# Patient Record
Sex: FEMALE | Race: WHITE | NOT HISPANIC OR LATINO | URBAN - METROPOLITAN AREA
[De-identification: names, ages, dates, MRNs, and addresses within clinical notes are randomized per-mention and may not be internally consistent; named-entity substitution may affect disease eponyms.]

---

## 2023-02-08 ENCOUNTER — LAB REQUISITION (OUTPATIENT)
Dept: LAB | Facility: HOSPITAL | Age: 88
End: 2023-02-08
Payer: MEDICARE

## 2023-02-08 DIAGNOSIS — Z79.01 LONG TERM (CURRENT) USE OF ANTICOAGULANTS: ICD-10-CM

## 2023-02-08 LAB
ALBUMIN SERPL-MCNC: 3 G/DL (ref 3.4–4.8)
ALBUMIN/GLOB SERPL: 1 RATIO (ref 1.1–2)
ALP SERPL-CCNC: 83 UNIT/L (ref 40–150)
ALT SERPL-CCNC: 18 UNIT/L (ref 0–55)
AST SERPL-CCNC: 27 UNIT/L (ref 5–34)
BASOPHILS # BLD AUTO: 0.04 X10(3)/MCL (ref 0–0.2)
BASOPHILS NFR BLD AUTO: 0.6 %
BILIRUBIN DIRECT+TOT PNL SERPL-MCNC: 1.9 MG/DL
BUN SERPL-MCNC: 26.5 MG/DL (ref 9.8–20.1)
CALCIUM SERPL-MCNC: 8.7 MG/DL (ref 8.4–10.2)
CHLORIDE SERPL-SCNC: 89 MMOL/L (ref 98–107)
CHOLEST SERPL-MCNC: 117 MG/DL
CHOLEST/HDLC SERPL: 4 {RATIO} (ref 0–5)
CO2 SERPL-SCNC: 36 MMOL/L (ref 23–31)
CREAT SERPL-MCNC: 1.1 MG/DL (ref 0.55–1.02)
EOSINOPHIL # BLD AUTO: 0.12 X10(3)/MCL (ref 0–0.9)
EOSINOPHIL NFR BLD AUTO: 1.8 %
ERYTHROCYTE [DISTWIDTH] IN BLOOD BY AUTOMATED COUNT: 14.2 % (ref 11.5–17)
GFR SERPLBLD CREATININE-BSD FMLA CKD-EPI: 48 MLS/MIN/1.73/M2
GLOBULIN SER-MCNC: 3.1 GM/DL (ref 2.4–3.5)
GLUCOSE SERPL-MCNC: 96 MG/DL (ref 82–115)
HCT VFR BLD AUTO: 34.4 % (ref 37–47)
HDLC SERPL-MCNC: 32 MG/DL (ref 35–60)
HGB BLD-MCNC: 11.7 GM/DL (ref 12–16)
IMM GRANULOCYTES # BLD AUTO: 0.02 X10(3)/MCL (ref 0–0.04)
IMM GRANULOCYTES NFR BLD AUTO: 0.3 %
LDLC SERPL CALC-MCNC: 75 MG/DL (ref 50–140)
LYMPHOCYTES # BLD AUTO: 1.25 X10(3)/MCL (ref 0.6–4.6)
LYMPHOCYTES NFR BLD AUTO: 18.5 %
MCH RBC QN AUTO: 35.3 PG
MCHC RBC AUTO-ENTMCNC: 34 MG/DL (ref 33–36)
MCV RBC AUTO: 103.9 FL (ref 80–94)
MONOCYTES # BLD AUTO: 0.93 X10(3)/MCL (ref 0.1–1.3)
MONOCYTES NFR BLD AUTO: 13.8 %
NEUTROPHILS # BLD AUTO: 4.39 X10(3)/MCL (ref 2.1–9.2)
NEUTROPHILS NFR BLD AUTO: 65 %
NRBC BLD AUTO-RTO: 0 %
PLATELET # BLD AUTO: 105 X10(3)/MCL (ref 130–400)
PMV BLD AUTO: 11.5 FL (ref 7.4–10.4)
POTASSIUM SERPL-SCNC: 2.8 MMOL/L (ref 3.5–5.1)
PREALB SERPL-MCNC: 8.4 MG/DL (ref 14–37)
PROT SERPL-MCNC: 6.1 GM/DL (ref 5.8–7.6)
RBC # BLD AUTO: 3.31 X10(6)/MCL (ref 4.2–5.4)
SODIUM SERPL-SCNC: 137 MMOL/L (ref 136–145)
TRIGL SERPL-MCNC: 48 MG/DL (ref 37–140)
TSH SERPL-ACNC: 1.63 UIU/ML (ref 0.35–4.94)
VLDLC SERPL CALC-MCNC: 10 MG/DL
WBC # SPEC AUTO: 6.8 X10(3)/MCL (ref 4.5–11.5)

## 2023-02-08 PROCEDURE — 80053 COMPREHEN METABOLIC PANEL: CPT | Performed by: NURSE PRACTITIONER

## 2023-02-08 PROCEDURE — 80061 LIPID PANEL: CPT | Performed by: NURSE PRACTITIONER

## 2023-02-08 PROCEDURE — 85025 COMPLETE CBC W/AUTO DIFF WBC: CPT | Performed by: NURSE PRACTITIONER

## 2023-02-08 PROCEDURE — 84443 ASSAY THYROID STIM HORMONE: CPT | Performed by: NURSE PRACTITIONER

## 2023-02-08 PROCEDURE — 84134 ASSAY OF PREALBUMIN: CPT | Performed by: NURSE PRACTITIONER

## 2023-02-17 ENCOUNTER — LAB REQUISITION (OUTPATIENT)
Dept: LAB | Facility: HOSPITAL | Age: 88
End: 2023-02-17
Payer: MEDICARE

## 2023-02-17 DIAGNOSIS — I10 ESSENTIAL (PRIMARY) HYPERTENSION: ICD-10-CM

## 2023-02-17 LAB
ANION GAP SERPL CALC-SCNC: 13 MEQ/L
BUN SERPL-MCNC: 34.1 MG/DL (ref 9.8–20.1)
CALCIUM SERPL-MCNC: 8.9 MG/DL (ref 8.4–10.2)
CHLORIDE SERPL-SCNC: 84 MMOL/L (ref 98–107)
CO2 SERPL-SCNC: 35 MMOL/L (ref 23–31)
CREAT SERPL-MCNC: 1.01 MG/DL (ref 0.55–1.02)
CREAT/UREA NIT SERPL: 34
GFR SERPLBLD CREATININE-BSD FMLA CKD-EPI: 53 MLS/MIN/1.73/M2
GLUCOSE SERPL-MCNC: 98 MG/DL (ref 82–115)
POTASSIUM SERPL-SCNC: 2.9 MMOL/L (ref 3.5–5.1)
SODIUM SERPL-SCNC: 132 MMOL/L (ref 136–145)

## 2023-02-17 PROCEDURE — 80048 BASIC METABOLIC PNL TOTAL CA: CPT | Performed by: INTERNAL MEDICINE

## 2023-03-05 ENCOUNTER — HOSPITAL ENCOUNTER (INPATIENT)
Facility: HOSPITAL | Age: 88
LOS: 10 days | Discharge: NURSING, REHAB OR LONG-TERM CARE | DRG: 177 | End: 2023-03-15
Attending: EMERGENCY MEDICINE | Admitting: INTERNAL MEDICINE
Payer: MEDICARE

## 2023-03-05 DIAGNOSIS — I50.9 ACUTE ON CHRONIC CONGESTIVE HEART FAILURE: ICD-10-CM

## 2023-03-05 DIAGNOSIS — J90 PLEURAL EFFUSION: ICD-10-CM

## 2023-03-05 DIAGNOSIS — R79.89 ELEVATED D-DIMER: ICD-10-CM

## 2023-03-05 DIAGNOSIS — R60.9 SWELLING: ICD-10-CM

## 2023-03-05 DIAGNOSIS — I50.9 ACUTE ON CHRONIC CONGESTIVE HEART FAILURE, UNSPECIFIED HEART FAILURE TYPE: Primary | ICD-10-CM

## 2023-03-05 DIAGNOSIS — R06.02 SHORTNESS OF BREATH: ICD-10-CM

## 2023-03-05 DIAGNOSIS — I82.409 DVT (DEEP VENOUS THROMBOSIS): ICD-10-CM

## 2023-03-05 DIAGNOSIS — U07.1 COVID: ICD-10-CM

## 2023-03-05 LAB
ALBUMIN SERPL-MCNC: 3 G/DL (ref 3.4–4.8)
ALBUMIN/GLOB SERPL: 0.8 RATIO (ref 1.1–2)
ALP SERPL-CCNC: 110 UNIT/L (ref 40–150)
ALT SERPL-CCNC: 21 UNIT/L (ref 0–55)
AST SERPL-CCNC: 54 UNIT/L (ref 5–34)
BASOPHILS # BLD AUTO: 0.03 X10(3)/MCL (ref 0–0.2)
BASOPHILS NFR BLD AUTO: 0.4 %
BILIRUBIN DIRECT+TOT PNL SERPL-MCNC: 3.1 MG/DL
BNP BLD-MCNC: 1080.3 PG/ML
BUN SERPL-MCNC: 25 MG/DL (ref 9.8–20.1)
CALCIUM SERPL-MCNC: 8.6 MG/DL (ref 8.4–10.2)
CHLORIDE SERPL-SCNC: 92 MMOL/L (ref 98–107)
CO2 SERPL-SCNC: 31 MMOL/L (ref 23–31)
CREAT SERPL-MCNC: 1.23 MG/DL (ref 0.55–1.02)
EOSINOPHIL # BLD AUTO: 0.09 X10(3)/MCL (ref 0–0.9)
EOSINOPHIL NFR BLD AUTO: 1.3 %
ERYTHROCYTE [DISTWIDTH] IN BLOOD BY AUTOMATED COUNT: 15.3 % (ref 11.5–17)
FLUAV AG UPPER RESP QL IA.RAPID: NOT DETECTED
FLUBV AG UPPER RESP QL IA.RAPID: NOT DETECTED
GFR SERPLBLD CREATININE-BSD FMLA CKD-EPI: 42 MLS/MIN/1.73/M2
GLOBULIN SER-MCNC: 3.8 GM/DL (ref 2.4–3.5)
GLUCOSE SERPL-MCNC: 108 MG/DL (ref 82–115)
HCT VFR BLD AUTO: 38.4 % (ref 37–47)
HGB BLD-MCNC: 12.4 G/DL (ref 12–16)
IMM GRANULOCYTES # BLD AUTO: 0.03 X10(3)/MCL (ref 0–0.04)
IMM GRANULOCYTES NFR BLD AUTO: 0.4 %
INR BLD: 2.9 (ref 0–1.3)
LYMPHOCYTES # BLD AUTO: 1.19 X10(3)/MCL (ref 0.6–4.6)
LYMPHOCYTES NFR BLD AUTO: 17.2 %
MACROCYTES BLD QL SMEAR: ABNORMAL
MCH RBC QN AUTO: 34.3 PG
MCHC RBC AUTO-ENTMCNC: 32.3 G/DL (ref 33–36)
MCV RBC AUTO: 106.1 FL (ref 80–94)
MONOCYTES # BLD AUTO: 0.9 X10(3)/MCL (ref 0.1–1.3)
MONOCYTES NFR BLD AUTO: 13 %
NEUTROPHILS # BLD AUTO: 4.69 X10(3)/MCL (ref 2.1–9.2)
NEUTROPHILS NFR BLD AUTO: 67.7 %
NRBC BLD AUTO-RTO: 0 %
PLATELET # BLD AUTO: 101 X10(3)/MCL (ref 130–400)
PLATELET # BLD EST: ABNORMAL 10*3/UL
PMV BLD AUTO: 11 FL (ref 7.4–10.4)
POTASSIUM SERPL-SCNC: 4.1 MMOL/L (ref 3.5–5.1)
PROT SERPL-MCNC: 6.8 GM/DL (ref 5.8–7.6)
PROTHROMBIN TIME: 29.7 SECONDS (ref 12.5–14.5)
RBC # BLD AUTO: 3.62 X10(6)/MCL (ref 4.2–5.4)
RBC MORPH BLD: ABNORMAL
RSV A 5' UTR RNA NPH QL NAA+PROBE: NOT DETECTED
SARS-COV-2 RNA RESP QL NAA+PROBE: DETECTED
SODIUM SERPL-SCNC: 132 MMOL/L (ref 136–145)
STIPPLED RBC (OHS): ABNORMAL
TROPONIN I SERPL-MCNC: 0.01 NG/ML (ref 0–0.04)
WBC # SPEC AUTO: 6.9 X10(3)/MCL (ref 4.5–11.5)

## 2023-03-05 PROCEDURE — 85610 PROTHROMBIN TIME: CPT | Performed by: STUDENT IN AN ORGANIZED HEALTH CARE EDUCATION/TRAINING PROGRAM

## 2023-03-05 PROCEDURE — 85025 COMPLETE CBC W/AUTO DIFF WBC: CPT | Performed by: STUDENT IN AN ORGANIZED HEALTH CARE EDUCATION/TRAINING PROGRAM

## 2023-03-05 PROCEDURE — 63600175 PHARM REV CODE 636 W HCPCS: Performed by: EMERGENCY MEDICINE

## 2023-03-05 PROCEDURE — 11000001 HC ACUTE MED/SURG PRIVATE ROOM

## 2023-03-05 PROCEDURE — 0241U COVID/RSV/FLU A&B PCR: CPT | Performed by: STUDENT IN AN ORGANIZED HEALTH CARE EDUCATION/TRAINING PROGRAM

## 2023-03-05 PROCEDURE — 99285 EMERGENCY DEPT VISIT HI MDM: CPT | Mod: 25

## 2023-03-05 PROCEDURE — 84484 ASSAY OF TROPONIN QUANT: CPT | Performed by: STUDENT IN AN ORGANIZED HEALTH CARE EDUCATION/TRAINING PROGRAM

## 2023-03-05 PROCEDURE — 83880 ASSAY OF NATRIURETIC PEPTIDE: CPT | Performed by: STUDENT IN AN ORGANIZED HEALTH CARE EDUCATION/TRAINING PROGRAM

## 2023-03-05 PROCEDURE — 80053 COMPREHEN METABOLIC PANEL: CPT | Performed by: STUDENT IN AN ORGANIZED HEALTH CARE EDUCATION/TRAINING PROGRAM

## 2023-03-05 PROCEDURE — 25000003 PHARM REV CODE 250: Performed by: EMERGENCY MEDICINE

## 2023-03-05 PROCEDURE — 93005 ELECTROCARDIOGRAM TRACING: CPT

## 2023-03-05 PROCEDURE — 93010 ELECTROCARDIOGRAM REPORT: CPT | Mod: ,,, | Performed by: INTERNAL MEDICINE

## 2023-03-05 PROCEDURE — 93010 EKG 12-LEAD: ICD-10-PCS | Mod: ,,, | Performed by: INTERNAL MEDICINE

## 2023-03-05 RX ORDER — NAPROXEN SODIUM 220 MG/1
162 TABLET, FILM COATED ORAL
Status: COMPLETED | OUTPATIENT
Start: 2023-03-05 | End: 2023-03-05

## 2023-03-05 RX ORDER — GUAIFENESIN/DEXTROMETHORPHAN 100-10MG/5
10 SYRUP ORAL EVERY 4 HOURS PRN
Status: DISCONTINUED | OUTPATIENT
Start: 2023-03-06 | End: 2023-03-15 | Stop reason: HOSPADM

## 2023-03-05 RX ORDER — FUROSEMIDE 10 MG/ML
80 INJECTION INTRAMUSCULAR; INTRAVENOUS
Status: COMPLETED | OUTPATIENT
Start: 2023-03-05 | End: 2023-03-05

## 2023-03-05 RX ADMIN — FUROSEMIDE 80 MG: 10 INJECTION, SOLUTION INTRAMUSCULAR; INTRAVENOUS at 11:03

## 2023-03-05 RX ADMIN — GUAIFENESIN SYRUP AND DEXTROMETHORPHAN 10 ML: 100; 10 SYRUP ORAL at 11:03

## 2023-03-05 RX ADMIN — ASPIRIN 81 MG 162 MG: 81 TABLET ORAL at 11:03

## 2023-03-05 RX ADMIN — NITROGLYCERIN 1 INCH: 20 OINTMENT TOPICAL at 11:03

## 2023-03-06 PROBLEM — I50.9 ACUTE ON CHRONIC CONGESTIVE HEART FAILURE: Status: ACTIVE | Noted: 2023-03-06

## 2023-03-06 LAB
ALBUMIN SERPL-MCNC: 2.6 G/DL (ref 3.4–4.8)
ALBUMIN/GLOB SERPL: 0.8 RATIO (ref 1.1–2)
ALP SERPL-CCNC: 94 UNIT/L (ref 40–150)
ALT SERPL-CCNC: 18 UNIT/L (ref 0–55)
AST SERPL-CCNC: 45 UNIT/L (ref 5–34)
BASOPHILS # BLD AUTO: 0.01 X10(3)/MCL (ref 0–0.2)
BASOPHILS NFR BLD AUTO: 0.1 %
BILIRUBIN DIRECT+TOT PNL SERPL-MCNC: 3.6 MG/DL
BUN SERPL-MCNC: 25.2 MG/DL (ref 9.8–20.1)
CALCIUM SERPL-MCNC: 8.3 MG/DL (ref 8.4–10.2)
CHLORIDE SERPL-SCNC: 92 MMOL/L (ref 98–107)
CO2 SERPL-SCNC: 29 MMOL/L (ref 23–31)
CREAT SERPL-MCNC: 1.01 MG/DL (ref 0.55–1.02)
D DIMER PPP IA.FEU-MCNC: 1.78 UG/ML FEU (ref 0–0.5)
EOSINOPHIL # BLD AUTO: 0 X10(3)/MCL (ref 0–0.9)
EOSINOPHIL NFR BLD AUTO: 0 %
ERYTHROCYTE [DISTWIDTH] IN BLOOD BY AUTOMATED COUNT: 15.3 % (ref 11.5–17)
GFR SERPLBLD CREATININE-BSD FMLA CKD-EPI: 53 MLS/MIN/1.73/M2
GLOBULIN SER-MCNC: 3.2 GM/DL (ref 2.4–3.5)
GLUCOSE SERPL-MCNC: 116 MG/DL (ref 82–115)
HCT VFR BLD AUTO: 34.3 % (ref 37–47)
HGB BLD-MCNC: 11.3 G/DL (ref 12–16)
IMM GRANULOCYTES # BLD AUTO: 0.03 X10(3)/MCL (ref 0–0.04)
IMM GRANULOCYTES NFR BLD AUTO: 0.4 %
LYMPHOCYTES # BLD AUTO: 0.63 X10(3)/MCL (ref 0.6–4.6)
LYMPHOCYTES NFR BLD AUTO: 9.1 %
MCH RBC QN AUTO: 34.8 PG
MCHC RBC AUTO-ENTMCNC: 32.9 G/DL (ref 33–36)
MCV RBC AUTO: 105.5 FL (ref 80–94)
MONOCYTES # BLD AUTO: 0.51 X10(3)/MCL (ref 0.1–1.3)
MONOCYTES NFR BLD AUTO: 7.4 %
NEUTROPHILS # BLD AUTO: 5.74 X10(3)/MCL (ref 2.1–9.2)
NEUTROPHILS NFR BLD AUTO: 83 %
NRBC BLD AUTO-RTO: 0 %
PLATELET # BLD AUTO: 86 X10(3)/MCL (ref 130–400)
PMV BLD AUTO: 11.3 FL (ref 7.4–10.4)
POTASSIUM SERPL-SCNC: 3.7 MMOL/L (ref 3.5–5.1)
PROT SERPL-MCNC: 5.8 GM/DL (ref 5.8–7.6)
RBC # BLD AUTO: 3.25 X10(6)/MCL (ref 4.2–5.4)
SODIUM SERPL-SCNC: 131 MMOL/L (ref 136–145)
WBC # SPEC AUTO: 6.9 X10(3)/MCL (ref 4.5–11.5)

## 2023-03-06 PROCEDURE — 85379 FIBRIN DEGRADATION QUANT: CPT | Performed by: INTERNAL MEDICINE

## 2023-03-06 PROCEDURE — 11000001 HC ACUTE MED/SURG PRIVATE ROOM

## 2023-03-06 PROCEDURE — 63600175 PHARM REV CODE 636 W HCPCS: Performed by: INTERNAL MEDICINE

## 2023-03-06 PROCEDURE — 27000221 HC OXYGEN, UP TO 24 HOURS

## 2023-03-06 PROCEDURE — 27000207 HC ISOLATION

## 2023-03-06 PROCEDURE — 21400001 HC TELEMETRY ROOM

## 2023-03-06 PROCEDURE — 25000003 PHARM REV CODE 250: Performed by: INTERNAL MEDICINE

## 2023-03-06 PROCEDURE — 25000003 PHARM REV CODE 250: Performed by: EMERGENCY MEDICINE

## 2023-03-06 PROCEDURE — 80053 COMPREHEN METABOLIC PANEL: CPT | Performed by: INTERNAL MEDICINE

## 2023-03-06 PROCEDURE — 25000242 PHARM REV CODE 250 ALT 637 W/ HCPCS: Performed by: INTERNAL MEDICINE

## 2023-03-06 PROCEDURE — 85025 COMPLETE CBC W/AUTO DIFF WBC: CPT | Performed by: INTERNAL MEDICINE

## 2023-03-06 RX ORDER — RIVAROXABAN 20 MG/1
1 TABLET, FILM COATED ORAL DAILY
COMMUNITY
Start: 2023-01-23

## 2023-03-06 RX ORDER — HYDROXYZINE PAMOATE 25 MG/1
25 CAPSULE ORAL 3 TIMES DAILY PRN
Status: DISCONTINUED | OUTPATIENT
Start: 2023-03-06 | End: 2023-03-15 | Stop reason: HOSPADM

## 2023-03-06 RX ORDER — METOPROLOL TARTRATE 25 MG/1
12.5 TABLET ORAL 2 TIMES DAILY
Status: DISCONTINUED | OUTPATIENT
Start: 2023-03-06 | End: 2023-03-15 | Stop reason: HOSPADM

## 2023-03-06 RX ORDER — TORSEMIDE 10 MG/1
2 TABLET ORAL DAILY
COMMUNITY
Start: 2023-01-23

## 2023-03-06 RX ORDER — HYDROXYZINE PAMOATE 25 MG/1
25 CAPSULE ORAL 3 TIMES DAILY PRN
COMMUNITY
Start: 2023-01-25

## 2023-03-06 RX ORDER — SODIUM CHLORIDE 0.9 % (FLUSH) 0.9 %
10 SYRINGE (ML) INJECTION
Status: DISCONTINUED | OUTPATIENT
Start: 2023-03-06 | End: 2023-03-15 | Stop reason: HOSPADM

## 2023-03-06 RX ORDER — ASPIRIN 81 MG/1
81 TABLET ORAL EVERY MORNING
Status: DISCONTINUED | OUTPATIENT
Start: 2023-03-06 | End: 2023-03-15 | Stop reason: HOSPADM

## 2023-03-06 RX ORDER — NEBIVOLOL 20 MG/1
1 TABLET ORAL DAILY
COMMUNITY
Start: 2023-01-23

## 2023-03-06 RX ORDER — TALC
6 POWDER (GRAM) TOPICAL NIGHTLY PRN
Status: DISCONTINUED | OUTPATIENT
Start: 2023-03-06 | End: 2023-03-15 | Stop reason: HOSPADM

## 2023-03-06 RX ORDER — ASPIRIN 81 MG/1
1 TABLET ORAL EVERY MORNING
COMMUNITY
Start: 2023-01-25

## 2023-03-06 RX ORDER — FUROSEMIDE 10 MG/ML
40 INJECTION INTRAMUSCULAR; INTRAVENOUS
Status: DISCONTINUED | OUTPATIENT
Start: 2023-03-06 | End: 2023-03-07

## 2023-03-06 RX ORDER — AZILSARTAN KAMEDOXOMIL AND CHLORTHALIDONE 40; 12.5 MG/1; MG/1
1 TABLET ORAL DAILY
COMMUNITY
Start: 2023-01-23

## 2023-03-06 RX ADMIN — DEXAMETHASONE 6 MG: 2 TABLET ORAL at 03:03

## 2023-03-06 RX ADMIN — GUAIFENESIN SYRUP AND DEXTROMETHORPHAN 10 ML: 100; 10 SYRUP ORAL at 11:03

## 2023-03-06 RX ADMIN — METOPROLOL TARTRATE 12.5 MG: 25 TABLET, FILM COATED ORAL at 11:03

## 2023-03-06 RX ADMIN — RIVAROXABAN 20 MG: 10 TABLET, FILM COATED ORAL at 04:03

## 2023-03-06 RX ADMIN — ASPIRIN 81 MG: 81 TABLET, COATED ORAL at 07:03

## 2023-03-06 RX ADMIN — FUROSEMIDE 40 MG: 10 INJECTION, SOLUTION INTRAMUSCULAR; INTRAVENOUS at 11:03

## 2023-03-06 RX ADMIN — REMDESIVIR 200 MG: 100 INJECTION, POWDER, LYOPHILIZED, FOR SOLUTION INTRAVENOUS at 03:03

## 2023-03-06 RX ADMIN — METOPROLOL TARTRATE 12.5 MG: 25 TABLET, FILM COATED ORAL at 09:03

## 2023-03-06 NOTE — ED PROVIDER NOTES
Encounter Date: 3/5/2023    SCRIBE #1 NOTE: I, Shannon Dorie, am scribing for, and in the presence of,  Del Washington MD. I have scribed the following portions of the note - Other sections scribed: HPI, ROS, PE, MDM.     History     Chief Complaint   Patient presents with    Shortness of Breath     Arrived by [facility] from [facility]. EMS reports sob and nonproductive cough. O2 96% on 2L NC on arrival, recently placed on oxygen 1 week ago. Denies CP. Hx of CHF, takes lasix      Patient is an 89 year old female with a pacemaker and a hx of CHF that presents to the ED via EMS from [facility] for shortness of breath onset a week ago. She also complains of a dry cough and bilateral leg swelling. She reports she was given a series of 3 Lasix injections that began on Wednesday 03/01. She denies chest pain, fever, nausea, and vomiting. Patient is prescribed Lasix.    PCP: Dr. Francheska M.D.    The history is provided by the patient and medical records. No  was used.   Shortness of Breath  This is a new problem. The current episode started more than 1 week ago. The problem has not changed since onset.Associated symptoms include cough and leg swelling. Pertinent negatives include no fever, no neck pain, no chest pain, no vomiting and no abdominal pain.   Review of patient's allergies indicates:   Allergen Reactions    Opioids - morphine analogues Anxiety     No past medical history on file.  No past surgical history on file.  No family history on file.     Review of Systems   Constitutional:  Negative for chills and fever.   Respiratory:  Positive for cough and shortness of breath. Negative for chest tightness.    Cardiovascular:  Positive for leg swelling. Negative for chest pain.   Gastrointestinal:  Negative for abdominal pain, nausea and vomiting.   Musculoskeletal:  Negative for myalgias and neck pain.   Neurological:  Negative for syncope.   All other systems reviewed and are negative.    Physical  Exam     Initial Vitals [03/05/23 2126]   BP Pulse Resp Temp SpO2   (!) 164/76 80 18 98.4 °F (36.9 °C) 95 %      MAP       --         Physical Exam    Nursing note and vitals reviewed.  Constitutional: She appears well-developed and well-nourished. No distress.   HENT:   Head: Normocephalic and atraumatic.   Eyes: Conjunctivae are normal.   Cardiovascular:  Normal rate and intact distal pulses.           Pulmonary/Chest: Tachypnea noted. No respiratory distress. She has no rhonchi.   Diffuse crackles. Coarse breath sounds.    Abdominal: Abdomen is soft. Bowel sounds are normal. There is no abdominal tenderness. There is no rebound and no guarding.   Musculoskeletal:         General: Edema present.      Comments: Bilateral lower extremities edema.      Neurological: She is alert. She has normal strength.   Skin: Skin is warm and dry.   Psychiatric: She has a normal mood and affect.       ED Course   Procedures  Labs Reviewed   COVID/RSV/FLU A&B PCR - Abnormal; Notable for the following components:       Result Value    SARS-CoV-2 PCR Detected (*)     All other components within normal limits    Narrative:     The Xpert Xpress SARS-CoV-2/FLU/RSV plus is a rapid, multiplexed real-time PCR test intended for the simultaneous qualitative detection and differentiation of SARS-CoV-2, Influenza A, Influenza B, and respiratory syncytial virus (RSV) viral RNA in either nasopharyngeal swab or nasal swab specimens.         COMPREHENSIVE METABOLIC PANEL - Abnormal; Notable for the following components:    Sodium Level 132 (*)     Chloride 92 (*)     Blood Urea Nitrogen 25.0 (*)     Creatinine 1.23 (*)     Albumin Level 3.0 (*)     Globulin 3.8 (*)     Albumin/Globulin Ratio 0.8 (*)     Bilirubin Total 3.1 (*)     Aspartate Aminotransferase 54 (*)     All other components within normal limits   B-TYPE NATRIURETIC PEPTIDE - Abnormal; Notable for the following components:    Natriuretic Peptide 1,080.3 (*)     All other components  within normal limits   PROTIME-INR - Abnormal; Notable for the following components:    PT 29.7 (*)     INR 2.90 (*)     All other components within normal limits   CBC WITH DIFFERENTIAL - Abnormal; Notable for the following components:    RBC 3.62 (*)     .1 (*)     MCHC 32.3 (*)     Platelet 101 (*)     MPV 11.0 (*)     All other components within normal limits   BLOOD SMEAR MICROSCOPIC EXAM (OLG) - Abnormal; Notable for the following components:    RBC Morph Abnormal (*)     Macrocyte 1+ (*)     Stippled RBC 1+ (*)     Platelet Est Decreased (*)     All other components within normal limits   TROPONIN I - Normal   CBC W/ AUTO DIFFERENTIAL    Narrative:     The following orders were created for panel order CBC auto differential.  Procedure                               Abnormality         Status                     ---------                               -----------         ------                     CBC with Differential[[id number]]        Abnormal            Final result                 Please view results for these tests on the individual orders.          Imaging Results              X-Ray Chest AP Portable (Preliminary result)  Result time 03/05/23 23:17:20      Wet Read by Del Washington MD (03/05/23 23:17:20, [facility] - Emergency Dept, Emergency Medicine)    Pleural effusions and congestive changes                                     Medications   dextromethorphan-guaiFENesin  mg/5 ml liquid 10 mL (has no administration in time range)   furosemide injection 80 mg (80 mg Intravenous Given 3/5/23 2340)   nitroGLYCERIN 2% TD oint ointment 1 inch (1 inch Transdermal Given 3/5/23 2340)   aspirin chewable tablet 162 mg (162 mg Oral Given 3/5/23 2340)          Medical Decision Making  Patient has a pacemaker and a hx of CHF. She presents to the ED via EMS from [facility] for shortness of breath onset a week ago. She also complains of a dry cough and bilateral leg swelling. She  reports she was given a series of 3 Lasix injections that began on Wednesday 03/01. She denies chest pain, fever, nausea, and vomiting. Patient takes Lasix.     EMS reports O2 96% on 2L NC on arrival. Notes patient was placed on oxygen a week ago.       Amount and/or Complexity of Data Reviewed  External Data Reviewed: labs, radiology and ECG.  Labs: ordered. Decision-making details documented in ED Course.  Radiology: ordered and independent interpretation performed. Decision-making details documented in ED Course.  ECG/medicine tests: ordered and independent interpretation performed. Decision-making details documented in ED Course.              Scribe Attestation:   Scribe #1: I performed the above scribed service and the documentation accurately describes the services I performed. I attest to the accuracy of the note.    Attending Attestation:           Physician Attestation for Scribe:  Physician Attestation Statement for Scribe #1: I, Del Washington MD, reviewed documentation, as scribed by Shannon Oshea in my presence, and it is both accurate and complete.           ED Course as of 03/05/23 2357   Sun Mar 05, 2023   2355 Patient is not typically on oxygen at the nursing home she was started on 2 L on Tuesday of this week.  She received IV Lasix at that facility for 3 days without improvement of symptoms.  Gave 80 of IV Lasix here.  Till the returned positive.  Discussed with the hospitalist who will admit [LF]      ED Course User Index  [LF] Del Washington MD                 Clinical Impression:   Final diagnoses:  [R06.02] Shortness of breath  [I50.9] Acute on chronic congestive heart failure, unspecified heart failure type (Primary)  [J90] Pleural effusion  [U07.1] COVID        ED Disposition Condition    Admit Stable                Del Washington MD  03/05/23 8744

## 2023-03-06 NOTE — H&P
[facility] Hospital Medicine History & Physical Examination       Patient Name: Ana Lake  MRN: [id number]  Patient Class: IP- Inpatient   Admission Date: 3/5/2023  9:42 PM  Length of Stay: 1  Admitting Service: Hospital Medicine   Attending Physician: Andre Navarro MD   Primary Care Provider: No primary care provider on file.  History source: EMR, patient and/or patient's family    CHIEF COMPLAINT   Shortness of Breath (Arrived by [facility] from [facility]. EMS reports sob and nonproductive cough. O2 96% on 2L NC on arrival, recently placed on oxygen 1 week ago. Denies CP. Hx of CHF, takes lasix )      HISTORY OF PRESENT ILLNESS:   Patient is a very pleasant 89-year-old female with past medical history of CHF (no echocardiogram on record), AFib on Xarelto and hypertension who presented to the ER for persistent severe cough and shortness of breath.  Patient reports she is been seen in the Cardiology Clinic 3 days in a row received IV Lasix all visits but despite this has continued to need supplemental oxygen since Thursday.  Prior to Thursday she is not required oxygen.  Due to persistent symptoms she was brought to the ER tonight for further evaluation.      On arrival to the ER she was afebrile, hemodynamically stable maintaining sats in the mid 90s on 3 L supplemental oxygen.  Laboratory work was significant for a BNP of 1000.  SARS-COVID-2 testing was positive.  Chest x-ray showed vascular congestion.  Patient reports being vaccinated but not boosted for COVID-19.  Hospitalist was consulted for admission and she was given a dose of IV Lasix.    PAST MEDICAL HISTORY:   CHF with unknown LVEF  AFib on Xarelto   Hypertension    PAST SURGICAL HISTORY:   Bilateral hip surgery   Hysterectomy  Appendectomy    ALLERGIES:   Opioids - morphine analogues    FAMILY HISTORY:   Reviewed and non-contributory     SOCIAL HISTORY:   No current tobacco drug or alcohol use    HOME  MEDICATIONS:     aspirin (ECOTRIN) 81 MG EC tablet Take 1 tablet by mouth every morning.   EDARBYCLOR 40-12.5 mg Tab Take 1 tablet by mouth once daily.   hydrOXYzine pamoate (VISTARIL) 25 MG Cap Take 25 mg by mouth 3 (three) times daily as needed.   nebivoloL (BYSTOLIC) 20 mg Tab Take 1 tablet by mouth once daily.   torsemide (DEMADEX) 10 MG Tab Take 2 tablets by mouth once daily.   XARELTO 20 mg Tab Take 1 tablet by mouth once daily.       REVIEW OF SYSTEMS:   Except as documented, all other systems reviewed and negative     PHYSICAL EXAM:   T 98.4 °F (36.9 °C)   BP (!) 140/67   P 61   RR (!) 25   O2 95 %  GENERAL: awake, alert, oriented and in no acute distress, non-toxic appearing   HEENT: normocephalic atraumatic   NECK: supple   LUNGS:  Coarse anteriorly with bilateral rhonchi   CVS: Regular rate and rhythm, normal peripheral perfusion  ABD: Soft, non-tender, non-distended, bowel sounds present  EXTREMITIES: no clubbing or cyanosis  SKIN: Warm, dry.   NEURO: alert and oriented, grossly without focal deficits   PSYCHIATRIC: Cooperative    LABS AND IMAGING:     Recent Labs     03/05/23 2203   WBC 6.9   RBC 3.62*   HGB 12.4   HCT 38.4   .1*   MCH 34.3   MCHC 32.3*   RDW 15.3   *     No results for input(s): LACTIC in the last 72 hours.  Recent Labs     03/05/23 2203   INR 2.90*     No results for input(s): HGBA1C, CHOL, TRIG, LDL, VLDL, HDL in the last 72 hours.   Recent Labs     03/05/23 2203   *   K 4.1   CHLORIDE 92*   CO2 31   BUN 25.0*   CREATININE 1.23*   GLUCOSE 108   CALCIUM 8.6   ALBUMIN 3.0*   GLOBULIN 3.8*   ALKPHOS 110   ALT 21   AST 54*   BILITOT 3.1*     Recent Labs     03/05/23 2203   BNP 1,080.3*   TROPONINI 0.015            ASSESSMENT & PLAN:   Multifactorial acute hypoxemic respiratory failure  Volume overload/decompensated heart failure contributing to above  COVID-19 infection/pneumonitis contributing to above  CHF with unknown LVEF  AFib on Xarelto   Hypertension    -  IV lasix, stric I and Os  - remdesivir/dexamethazone day 1  - wean o2 as tolerated   - resume home meds as appropriate     DVT prophylaxis: cont xarelto   Code status: full, as per daughter and patient     If patient was admitted under observational status it is with my approval/permission.     At least 55 min was spent on this history and physical.  Time seen: 1 AM   Critical care time = 35 min; Critical care diagnosis = hypoxia/covid   Andre Navarro MD

## 2023-03-06 NOTE — CONSULTS
Inpatient consult to Cardiology  Consult performed by: PEE Agustin  Consult ordered by: Andre Navarro MD  Reason for consult: Heart Failure      [facility] - Emergency Dept  Cardiology  Consult Note    Patient Name: Ana Lake  MRN: [id number]  Admission Date: 3/5/2023  Hospital Length of Stay: 1 days  Code Status: Full Code   Attending Provider: Andre Navarro MD   Consulting Provider: PEE Agustin  Primary Care Physician: No primary care provider on file.  Principal Problem:Acute on chronic congestive heart failure    Patient information was obtained from past medical records, ER records, and primary team.     Subjective:   Consultation Reason: Heart Failure    HPI:   Ms. Lake is a 89 year old female, known to Dr. Reyes, who presented to the hospital with persistent cough and shortness of breath. She was recently evaluated in [facility] and given IV Lasix due to her symptoms. Due to her persistent symptoms she presented to the hospital. She is noted to be COVID-19 Positive. Troponin is noted to be normal. BNP noted to be 10.80. She is negative for RSV and Acute Influenza infections. Chest Radiograph revealed pulmonary vascular congestion with small right pleural effusion. [facility] is consulted for HF Management.    PMH: Hypertension, PAF, SSS/PPM, Diastolic Heart Failure, Pulmonary Hypertension, Chronic Kidney Disease IV, VHD- MR, Lymphedema  PSH: Pacemaker, Appendectomy, Hysterectomy, Cataract Extraction, Hip Replacement  Family History: Father- Cancer, Mother- Emphysema, Cancer  Social History: Tobacco- Negative, Alcohol- Negative, Substance Abuse- Negative    Previous Cardiac Diagnostics:   Echocardiogram (3.1.23):  The study quality is average.   The left ventricle is decreased in size. Global left ventricular systolic function is normal. The left ventricular ejection fraction is 50%. The ventricular septum exhibits paradoxical motion, suggestive of right  ventricular pressure and/or volume overload. The left ventricle diastolic function is indeterminate. Concentric left ventricular hypertrophy is present. It is mild.   The right ventricle is moderately enlarged. The left atrial diameter is moderately increased. The right atrium is moderately enlarged.  Mild mitral annular calcification is noted.  Diffuse thickening of the aortic valve cusps is noted with normal cuspal excursion.  Severe (4+) tricuspid regurgitation. Moderate (2+) mitral regurgitation. Trace pulmonic regurgitation.   A dilated inferior vena cava is noted with poor inspiratory collapse. Hepatic vein flow reversal noted.  The estimated pulmonary artery systolic pressure is 35 mmHg assuming a right atrial pressure of 15 mmHg, appears to be underestimated.     MPI (10.10.17):  This is an abnormal perfusion study. There is no evidence of ischemia.   This scan is suggestive of low risk for future cardiovascular events.   Small fixed perfusion abnormality of mild intensity in the apical segment. Small fixed perfusion abnormality of mild intensity in the apical anterior segment.   The left ventricular cavity is noted to be normal on the stress study. The left ventricular ejection fraction was calculated to be 84% and left ventricular global function is hyperkinetic. This finding may be secondary to small ventricular size.   The study quality is good.     Review of patient's allergies indicates:   Allergen Reactions    Opioids - morphine analogues Anxiety       No current facility-administered medications on file prior to encounter.     Current Outpatient Medications on File Prior to Encounter   Medication Sig    aspirin (ECOTRIN) 81 MG EC tablet Take 1 tablet by mouth every morning.    EDARBYCLOR 40-12.5 mg Tab Take 1 tablet by mouth once daily.    hydrOXYzine pamoate (VISTARIL) 25 MG Cap Take 25 mg by mouth 3 (three) times daily as needed.    nebivoloL (BYSTOLIC) 20 mg Tab Take 1 tablet by mouth once daily.     torsemide (DEMADEX) 10 MG Tab Take 2 tablets by mouth once daily.    XARELTO 20 mg Tab Take 1 tablet by mouth once daily.     Review of Systems Deferred in the Setting of COVID-19 Infection.    Objective:     Vital Signs (Most Recent):  Temp: 97.2 °F (36.2 °C) (03/06/23 0300)  Pulse: 61 (03/06/23 0600)  Resp: (!) 35 (03/06/23 0300)  BP: (!) 118/54 (03/06/23 0300)  SpO2: (!) 93 % (03/06/23 0300)   Vital Signs (24h Range):  Temp:  [97.2 °F (36.2 °C)-98.4 °F (36.9 °C)] 97.2 °F (36.2 °C)  Pulse:  [60-80] 61  Resp:  [18-35] 35  SpO2:  [93 %-95 %] 93 %  BP: (118-164)/(54-76) 118/54     Weight: 77.1 kg (170 lb)  Body mass index is 34.34 kg/m².    SpO2: (!) 93 %       No intake or output data in the 24 hours ending 03/06/23 0738    Lines/Drains/Airways       Peripheral Intravenous Line  Duration                  Peripheral IV - Single Lumen 03/05/23 2150 18 G Anterior;Left Forearm <1 day                    Significant Labs:  Recent Results (from the past 72 hour(s))   COVID/RSV/FLU A&B PCR    Collection Time: 03/05/23  9:40 PM   Result Value Ref Range    Influenza A PCR Not Detected Not Detected    Influenza B PCR Not Detected Not Detected    Respiratory Syncytial Virus PCR Not Detected Not Detected    SARS-CoV-2 PCR Detected (A) Not Detected, Negative, Invalid   Comprehensive metabolic panel    Collection Time: 03/05/23 10:03 PM   Result Value Ref Range    Sodium Level 132 (L) 136 - 145 mmol/L    Potassium Level 4.1 3.5 - 5.1 mmol/L    Chloride 92 (L) 98 - 107 mmol/L    Carbon Dioxide 31 23 - 31 mmol/L    Glucose Level 108 82 - 115 mg/dL    Blood Urea Nitrogen 25.0 (H) 9.8 - 20.1 mg/dL    Creatinine 1.23 (H) 0.55 - 1.02 mg/dL    Calcium Level Total 8.6 8.4 - 10.2 mg/dL    Protein Total 6.8 5.8 - 7.6 gm/dL    Albumin Level 3.0 (L) 3.4 - 4.8 g/dL    Globulin 3.8 (H) 2.4 - 3.5 gm/dL    Albumin/Globulin Ratio 0.8 (L) 1.1 - 2.0 ratio    Bilirubin Total 3.1 (H) <=1.5 mg/dL    Alkaline Phosphatase 110 40 - 150 unit/L     Alanine Aminotransferase 21 0 - 55 unit/L    Aspartate Aminotransferase 54 (H) 5 - 34 unit/L    eGFR 42 mls/min/1.73/m2   Troponin I    Collection Time: 03/05/23 10:03 PM   Result Value Ref Range    Troponin-I 0.015 0.000 - 0.045 ng/mL   Brain natriuretic peptide    Collection Time: 03/05/23 10:03 PM   Result Value Ref Range    Natriuretic Peptide 1,080.3 (H) <=100.0 pg/mL   Protime-INR    Collection Time: 03/05/23 10:03 PM   Result Value Ref Range    PT 29.7 (H) 12.5 - 14.5 seconds    INR 2.90 (H) 0.00 - 1.30   CBC with Differential    Collection Time: 03/05/23 10:03 PM   Result Value Ref Range    WBC 6.9 4.5 - 11.5 x10(3)/mcL    RBC 3.62 (L) 4.20 - 5.40 x10(6)/mcL    Hgb 12.4 12.0 - 16.0 g/dL    Hct 38.4 37.0 - 47.0 %    .1 (H) 80.0 - 94.0 fL    MCH 34.3 pg    MCHC 32.3 (L) 33.0 - 36.0 g/dL    RDW 15.3 11.5 - 17.0 %    Platelet 101 (L) 130 - 400 x10(3)/mcL    MPV 11.0 (H) 7.4 - 10.4 fL    Neut % 67.7 %    Lymph % 17.2 %    Mono % 13.0 %    Eos % 1.3 %    Basophil % 0.4 %    Lymph # 1.19 0.6 - 4.6 x10(3)/mcL    Neut # 4.69 2.1 - 9.2 x10(3)/mcL    Mono # 0.90 0.1 - 1.3 x10(3)/mcL    Eos # 0.09 0 - 0.9 x10(3)/mcL    Baso # 0.03 0 - 0.2 x10(3)/mcL    IG# 0.03 0 - 0.04 x10(3)/mcL    IG% 0.4 %    NRBC% 0.0 %   Blood Smear Microscopic Exam    Collection Time: 03/05/23 10:03 PM   Result Value Ref Range    RBC Morph Abnormal (A) Normal    Macrocyte 1+ (A) (none)    Stippled RBC 1+ (A) (none)    Platelet Est Decreased (A) Normal, Adequate   Comprehensive metabolic panel    Collection Time: 03/06/23  6:11 AM   Result Value Ref Range    Sodium Level 131 (L) 136 - 145 mmol/L    Potassium Level 3.7 3.5 - 5.1 mmol/L    Chloride 92 (L) 98 - 107 mmol/L    Carbon Dioxide 29 23 - 31 mmol/L    Glucose Level 116 (H) 82 - 115 mg/dL    Blood Urea Nitrogen 25.2 (H) 9.8 - 20.1 mg/dL    Creatinine 1.01 0.55 - 1.02 mg/dL    Calcium Level Total 8.3 (L) 8.4 - 10.2 mg/dL    Protein Total 5.8 5.8 - 7.6 gm/dL    Albumin Level 2.6 (L) 3.4  - 4.8 g/dL    Globulin 3.2 2.4 - 3.5 gm/dL    Albumin/Globulin Ratio 0.8 (L) 1.1 - 2.0 ratio    Bilirubin Total 3.6 (H) <=1.5 mg/dL    Alkaline Phosphatase 94 40 - 150 unit/L    Alanine Aminotransferase 18 0 - 55 unit/L    Aspartate Aminotransferase 45 (H) 5 - 34 unit/L    eGFR 53 mls/min/1.73/m2   CBC with Differential    Collection Time: 03/06/23  6:11 AM   Result Value Ref Range    WBC 6.9 4.5 - 11.5 x10(3)/mcL    RBC 3.25 (L) 4.20 - 5.40 x10(6)/mcL    Hgb 11.3 (L) 12.0 - 16.0 g/dL    Hct 34.3 (L) 37.0 - 47.0 %    .5 (H) 80.0 - 94.0 fL    MCH 34.8 pg    MCHC 32.9 (L) 33.0 - 36.0 g/dL    RDW 15.3 11.5 - 17.0 %    Platelet 86 (L) 130 - 400 x10(3)/mcL    MPV 11.3 (H) 7.4 - 10.4 fL    Neut % 83.0 %    Lymph % 9.1 %    Mono % 7.4 %    Eos % 0.0 %    Basophil % 0.1 %    Lymph # 0.63 0.6 - 4.6 x10(3)/mcL    Neut # 5.74 2.1 - 9.2 x10(3)/mcL    Mono # 0.51 0.1 - 1.3 x10(3)/mcL    Eos # 0.00 0 - 0.9 x10(3)/mcL    Baso # 0.01 0 - 0.2 x10(3)/mcL    IG# 0.03 0 - 0.04 x10(3)/mcL    IG% 0.4 %    NRBC% 0.0 %       Significant Imaging:  Imaging Results              X-Ray Chest AP Portable (Preliminary result)  Result time 03/05/23 23:17:20      Wet Read by Del Washington MD (03/05/23 23:17:20, [facility] - Emergency Dept, Emergency Medicine)    Pleural effusions and congestive changes                                  EKG:        Telemetry:  Paced    Physical Exam  Vitals and nursing note reviewed.   Cardiovascular:      Rate and Rhythm: Normal rate and regular rhythm.   Limited Physical Exam in the Setting of COVID-19 Infection.    Home Medications:   No current facility-administered medications on file prior to encounter.     Current Outpatient Medications on File Prior to Encounter   Medication Sig Dispense Refill    aspirin (ECOTRIN) 81 MG EC tablet Take 1 tablet by mouth every morning.      EDARBYCLOR 40-12.5 mg Tab Take 1 tablet by mouth once daily.      hydrOXYzine pamoate (VISTARIL) 25 MG Cap Take  25 mg by mouth 3 (three) times daily as needed.      nebivoloL (BYSTOLIC) 20 mg Tab Take 1 tablet by mouth once daily.      torsemide (DEMADEX) 10 MG Tab Take 2 tablets by mouth once daily.      XARELTO 20 mg Tab Take 1 tablet by mouth once daily.         Current Inpatient Medications:    Current Facility-Administered Medications:     aspirin EC tablet 81 mg, 81 mg, Oral, QAM, Andre Navarro MD, 81 mg at 03/06/23 0700    azilsartan med-chlorthalidone 40-12.5 mg Tab 1 tablet, 1 tablet, Oral, Daily, Andre Navarro MD    dexAMETHasone tablet 6 mg, 6 mg, Oral, Daily, Andre Navarro MD, 6 mg at 03/06/23 0340    dextromethorphan-guaiFENesin  mg/5 ml liquid 10 mL, 10 mL, Oral, Q4H PRN, Del Washington MD, 10 mL at 03/05/23 2359    furosemide injection 40 mg, 40 mg, Intravenous, Q12H, Andre Navarro MD    hydrOXYzine pamoate capsule 25 mg, 25 mg, Oral, TID PRN, Andre Navarro MD    melatonin tablet 6 mg, 6 mg, Oral, Nightly PRN, Andre Navarro MD    metoprolol tartrate (LOPRESSOR) split tablet 12.5 mg, 12.5 mg, Oral, BID, Andre Navarro MD    [COMPLETED] remdesivir 200 mg in sodium chloride 0.9% 250 mL infusion, 200 mg, Intravenous, Q24H, Stopped at 03/06/23 0414 **FOLLOWED BY** [START ON 3/7/2023] remdesivir 100 mg in sodium chloride 0.9% 100 mL infusion, 100 mg, Intravenous, Daily, Andre Navarro MD    rivaroxaban tablet 20 mg, 20 mg, Oral, with dinner, Andre Navarro MD    sodium chloride 0.9% flush 10 mL, 10 mL, Intravenous, PRN, Andre Navarro MD    Current Outpatient Medications:     aspirin (ECOTRIN) 81 MG EC tablet, Take 1 tablet by mouth every morning., Disp: , Rfl:     EDARBYCLOR 40-12.5 mg Tab, Take 1 tablet by mouth once daily., Disp: , Rfl:     hydrOXYzine pamoate (VISTARIL) 25 MG Cap, Take 25 mg by mouth 3 (three) times daily as needed., Disp: , Rfl:     nebivoloL (BYSTOLIC) 20 mg Tab, Take 1 tablet by mouth once daily., Disp: , Rfl:      torsemide (DEMADEX) 10 MG Tab, Take 2 tablets by mouth once daily., Disp: , Rfl:     XARELTO 20 mg Tab, Take 1 tablet by mouth once daily., Disp: , Rfl:     VTE Risk Mitigation (From admission, onward)           Ordered     rivaroxaban tablet 20 mg  with dinner         03/06/23 0254     IP VTE HIGH RISK PATIENT  Once         03/06/23 0254     Place sequential compression device  Until discontinued         03/06/23 0254                  Assessment:   Acute on Chronic Heart Failure with Preserved EF with Right Heart Failure    - EF 50%    - Small Right Pleural Effusion  Valvular Heart Disease    - TR (Severe), MR (Moderate)  PAF    - On Xarelto  Hypertension  Sick Sinus Syndrome Status Post Dual Chamber Pacemaker (Medtronic)  Pulmonary Hypertension  Acute COVID-19 Infection    - On Remdesivir  Thrombocytopenia  Venous Insufficiency with Lymphedema  Chronic Kidney Disease Stage IV    Plan:   Continue Lasix 40 Mg IVP q12h; Ensure Accurate I/0 & Daily Weights  Continue Current Cardiac Medications  On Xarelto for Stroke Risk Reduction  Monitor Renal Function Closely  Consult Medtronic Rep to Assess AV Delay on Pacemaker  Labs in AM (BMP, Mag Level)    Thank you for your consult.     Clovis Dhillon, PEE  Cardiology  [facility] - Emergency Dept  03/06/2023 7:38 AM

## 2023-03-07 LAB
ANION GAP SERPL CALC-SCNC: 8 MEQ/L
BUN SERPL-MCNC: 29.3 MG/DL (ref 9.8–20.1)
CALCIUM SERPL-MCNC: 8.6 MG/DL (ref 8.4–10.2)
CHLORIDE SERPL-SCNC: 93 MMOL/L (ref 98–107)
CO2 SERPL-SCNC: 31 MMOL/L (ref 23–31)
CREAT SERPL-MCNC: 1.02 MG/DL (ref 0.55–1.02)
CREAT/UREA NIT SERPL: 29
GFR SERPLBLD CREATININE-BSD FMLA CKD-EPI: 53 MLS/MIN/1.73/M2
GLUCOSE SERPL-MCNC: 109 MG/DL (ref 82–115)
MAGNESIUM SERPL-MCNC: 1.8 MG/DL (ref 1.6–2.6)
POTASSIUM SERPL-SCNC: 3.6 MMOL/L (ref 3.5–5.1)
SODIUM SERPL-SCNC: 132 MMOL/L (ref 136–145)

## 2023-03-07 PROCEDURE — 25000003 PHARM REV CODE 250: Performed by: EMERGENCY MEDICINE

## 2023-03-07 PROCEDURE — 21400001 HC TELEMETRY ROOM

## 2023-03-07 PROCEDURE — 27000207 HC ISOLATION

## 2023-03-07 PROCEDURE — 11000001 HC ACUTE MED/SURG PRIVATE ROOM

## 2023-03-07 PROCEDURE — 80048 BASIC METABOLIC PNL TOTAL CA: CPT | Performed by: NURSE PRACTITIONER

## 2023-03-07 PROCEDURE — 63600175 PHARM REV CODE 636 W HCPCS: Performed by: NURSE PRACTITIONER

## 2023-03-07 PROCEDURE — 25000003 PHARM REV CODE 250: Performed by: NURSE PRACTITIONER

## 2023-03-07 PROCEDURE — 25000003 PHARM REV CODE 250: Performed by: INTERNAL MEDICINE

## 2023-03-07 PROCEDURE — 27000221 HC OXYGEN, UP TO 24 HOURS

## 2023-03-07 PROCEDURE — 63600175 PHARM REV CODE 636 W HCPCS: Mod: JZ,TB | Performed by: INTERNAL MEDICINE

## 2023-03-07 PROCEDURE — 25000242 PHARM REV CODE 250 ALT 637 W/ HCPCS: Performed by: INTERNAL MEDICINE

## 2023-03-07 PROCEDURE — 83735 ASSAY OF MAGNESIUM: CPT | Performed by: NURSE PRACTITIONER

## 2023-03-07 RX ORDER — MAGNESIUM SULFATE HEPTAHYDRATE 40 MG/ML
2 INJECTION, SOLUTION INTRAVENOUS ONCE
Status: COMPLETED | OUTPATIENT
Start: 2023-03-07 | End: 2023-03-07

## 2023-03-07 RX ORDER — FUROSEMIDE 10 MG/ML
40 INJECTION INTRAMUSCULAR; INTRAVENOUS ONCE
Status: COMPLETED | OUTPATIENT
Start: 2023-03-07 | End: 2023-03-07

## 2023-03-07 RX ORDER — POTASSIUM CHLORIDE 20 MEQ/1
20 TABLET, EXTENDED RELEASE ORAL ONCE
Status: COMPLETED | OUTPATIENT
Start: 2023-03-07 | End: 2023-03-07

## 2023-03-07 RX ADMIN — REMDESIVIR 100 MG: 100 INJECTION, POWDER, LYOPHILIZED, FOR SOLUTION INTRAVENOUS at 09:03

## 2023-03-07 RX ADMIN — GUAIFENESIN SYRUP AND DEXTROMETHORPHAN 10 ML: 100; 10 SYRUP ORAL at 06:03

## 2023-03-07 RX ADMIN — ASPIRIN 81 MG: 81 TABLET, COATED ORAL at 06:03

## 2023-03-07 RX ADMIN — RIVAROXABAN 20 MG: 10 TABLET, FILM COATED ORAL at 03:03

## 2023-03-07 RX ADMIN — METOPROLOL TARTRATE 12.5 MG: 25 TABLET, FILM COATED ORAL at 09:03

## 2023-03-07 RX ADMIN — GUAIFENESIN SYRUP AND DEXTROMETHORPHAN 10 ML: 100; 10 SYRUP ORAL at 09:03

## 2023-03-07 RX ADMIN — DEXAMETHASONE 6 MG: 2 TABLET ORAL at 09:03

## 2023-03-07 RX ADMIN — MAGNESIUM SULFATE HEPTAHYDRATE 2 G: 40 INJECTION, SOLUTION INTRAVENOUS at 11:03

## 2023-03-07 RX ADMIN — FUROSEMIDE 40 MG: 10 INJECTION, SOLUTION INTRAMUSCULAR; INTRAVENOUS at 11:03

## 2023-03-07 RX ADMIN — POTASSIUM CHLORIDE 20 MEQ: 1500 TABLET, EXTENDED RELEASE ORAL at 11:03

## 2023-03-07 NOTE — PROGRESS NOTES
[facility] 4th Floor Medical Telemetry  Wound Care    Patient Name:  Ana Lake   MRN:  [id number]  Date: 3/7/2023  Diagnosis: Acute on chronic congestive heart failure    History:     No past medical history on file.    Social History     Socioeconomic History    Marital status:        Precautions:     Allergies as of 03/05/2023 - Reviewed 03/05/2023   Allergen Reaction Noted    Opioids - morphine analogues Anxiety 03/05/2023       WOC Assessment Details/Treatment      03/07/23 1150        Altered Skin Integrity 03/06/23 2300 Buttocks   Date First Assessed/Time First Assessed: 03/06/23 2300   Altered Skin Integrity Present on Admission - Did Patient arrive to the hospital with altered skin?: yes  Location: Buttocks   Wound Image    Care Cleansed with:;Soap and water;Applied:;Skin Barrier     WOCN Consult related to sacrum; patient has no open areas noted but red and needs offloading.  Cleanse sacrum with Remedy foam, apply Z-Guard skin barrier BID and PRN.  Care Recommendations:   Continue head to toe skin assessment q 12 hours;  Continue turning/repositioning q 2 hours or schedule to prevent erythema;  Continue with specialty bed and wedge for 30 degree pelvic tilt;  Keep sacral area clean and dry;  Avoid foam dressings on sacrum;  Avoid adult briefs;  Encourage activity as ordered;  Ensure adequate nutrition/hydration for healing;    Orders placed. 03/07/2023

## 2023-03-07 NOTE — NURSING
Nurses Note -- 4 Eyes      3/7/2023   12:57 AM      Skin assessed during: Admit      [] No Pressure Injuries Present    []Prevention Measures Documented      [x] Yes- Altered Skin Integrity Present or Discovered   [x] LDA Added if Not in Epic (Describe Wound)   [x] New Altered Skin Integrity was Present on Admit and Documented in LDA   [x] Wound Image Taken    Wound Care Consulted? Yes    Attending Nurse:  Shakira Carey RN     Second RN/Staff Member:  Bebe Sena RN

## 2023-03-07 NOTE — PROGRESS NOTES
[facility] - 4th Floor Medical Telemetry  Cardiology  Progress Note    Patient Name: Ana Lake  MRN: [id number]  Admission Date: 3/5/2023  Hospital Length of Stay: 2 days  Code Status: Full Code   Attending Physician: Andre Navarro MD   Primary Care Physician: No primary care provider on file.  Expected Discharge Date:   Principal Problem:Acute on chronic congestive heart failure    Subjective:   Consultation Reason: Heart Failure     HPI:   Ms. Lake is a 89 year old female, known to Dr. Reyes, who presented to the hospital with persistent cough and shortness of breath. She was recently evaluated in [facility] and given IV Lasix due to her symptoms. Due to her persistent symptoms she presented to the hospital. She is noted to be COVID-19 Positive. Troponin is noted to be normal. BNP noted to be 10.80. She is negative for RSV and Acute Influenza infections. Chest Radiograph revealed pulmonary vascular congestion with small right pleural effusion. [facility] is consulted for HF Management.    Hospital Course:   3.7.23: NAD Noted. Vitals Stable. Paced on Tele. + Diuresis. On Nasal Cannula Oxygen Support.      PMH: Hypertension, PAF, SSS/PPM, Diastolic Heart Failure, Pulmonary Hypertension, Chronic Kidney Disease IV, VHD- MR, Lymphedema  PSH: Pacemaker, Appendectomy, Hysterectomy, Cataract Extraction, Hip Replacement  Family History: Father- Cancer, Mother- Emphysema, Cancer  Social History: Tobacco- Negative, Alcohol- Negative, Substance Abuse- Negative     Previous Cardiac Diagnostics:   Echocardiogram (3.1.23):  The study quality is average.   The left ventricle is decreased in size. Global left ventricular systolic function is normal. The left ventricular ejection fraction is 50%. The ventricular septum exhibits paradoxical motion, suggestive of right ventricular pressure and/or volume overload. The left ventricle diastolic function is indeterminate. Concentric left ventricular  hypertrophy is present. It is mild.   The right ventricle is moderately enlarged. The left atrial diameter is moderately increased. The right atrium is moderately enlarged.  Mild mitral annular calcification is noted.  Diffuse thickening of the aortic valve cusps is noted with normal cuspal excursion.  Severe (4+) tricuspid regurgitation. Moderate (2+) mitral regurgitation. Trace pulmonic regurgitation.   A dilated inferior vena cava is noted with poor inspiratory collapse. Hepatic vein flow reversal noted.  The estimated pulmonary artery systolic pressure is 35 mmHg assuming a right atrial pressure of 15 mmHg, appears to be underestimated.      MPI (10.10.17):  This is an abnormal perfusion study. There is no evidence of ischemia.   This scan is suggestive of low risk for future cardiovascular events.   Small fixed perfusion abnormality of mild intensity in the apical segment. Small fixed perfusion abnormality of mild intensity in the apical anterior segment.   The left ventricular cavity is noted to be normal on the stress study. The left ventricular ejection fraction was calculated to be 84% and left ventricular global function is hyperkinetic. This finding may be secondary to small ventricular size.   The study quality is good.     ROS Deferred in the Setting of COVID-19 Infection.    Objective:     Vital Signs (Most Recent):  Temp: 97.8 °F (36.6 °C) (03/07/23 0734)  Pulse: 60 (03/07/23 0920)  Resp: 20 (03/07/23 0431)  BP: 132/60 (03/07/23 0920)  SpO2: 98 % (03/07/23 0431) Vital Signs (24h Range):  Temp:  [97.3 °F (36.3 °C)-97.8 °F (36.6 °C)] 97.8 °F (36.6 °C)  Pulse:  [60-66] 60  Resp:  [14-28] 20  SpO2:  [94 %-98 %] 98 %  BP: (108-147)/(58-78) 132/60     Weight: 75.3 kg (166 lb 0.1 oz)  Body mass index is 33.53 kg/m².    SpO2: 98 %         Intake/Output Summary (Last 24 hours) at 3/7/2023 1012  Last data filed at 3/7/2023 0400  Gross per 24 hour   Intake 240 ml   Output 800 ml   Net -560 ml        Lines/Drains/Airways       Drain  Duration             Female External Urinary Catheter 03/06/23 1936 <1 day              Peripheral Intravenous Line  Duration                  Peripheral IV - Single Lumen 03/05/23 2150 18 G Anterior;Left Forearm 1 day                  Significant Labs:   Recent Results (from the past 72 hour(s))   COVID/RSV/FLU A&B PCR    Collection Time: 03/05/23  9:40 PM   Result Value Ref Range    Influenza A PCR Not Detected Not Detected    Influenza B PCR Not Detected Not Detected    Respiratory Syncytial Virus PCR Not Detected Not Detected    SARS-CoV-2 PCR Detected (A) Not Detected, Negative, Invalid   Comprehensive metabolic panel    Collection Time: 03/05/23 10:03 PM   Result Value Ref Range    Sodium Level 132 (L) 136 - 145 mmol/L    Potassium Level 4.1 3.5 - 5.1 mmol/L    Chloride 92 (L) 98 - 107 mmol/L    Carbon Dioxide 31 23 - 31 mmol/L    Glucose Level 108 82 - 115 mg/dL    Blood Urea Nitrogen 25.0 (H) 9.8 - 20.1 mg/dL    Creatinine 1.23 (H) 0.55 - 1.02 mg/dL    Calcium Level Total 8.6 8.4 - 10.2 mg/dL    Protein Total 6.8 5.8 - 7.6 gm/dL    Albumin Level 3.0 (L) 3.4 - 4.8 g/dL    Globulin 3.8 (H) 2.4 - 3.5 gm/dL    Albumin/Globulin Ratio 0.8 (L) 1.1 - 2.0 ratio    Bilirubin Total 3.1 (H) <=1.5 mg/dL    Alkaline Phosphatase 110 40 - 150 unit/L    Alanine Aminotransferase 21 0 - 55 unit/L    Aspartate Aminotransferase 54 (H) 5 - 34 unit/L    eGFR 42 mls/min/1.73/m2   Troponin I    Collection Time: 03/05/23 10:03 PM   Result Value Ref Range    Troponin-I 0.015 0.000 - 0.045 ng/mL   Brain natriuretic peptide    Collection Time: 03/05/23 10:03 PM   Result Value Ref Range    Natriuretic Peptide 1,080.3 (H) <=100.0 pg/mL   Protime-INR    Collection Time: 03/05/23 10:03 PM   Result Value Ref Range    PT 29.7 (H) 12.5 - 14.5 seconds    INR 2.90 (H) 0.00 - 1.30   CBC with Differential    Collection Time: 03/05/23 10:03 PM   Result Value Ref Range    WBC 6.9 4.5 - 11.5 x10(3)/mcL    RBC  3.62 (L) 4.20 - 5.40 x10(6)/mcL    Hgb 12.4 12.0 - 16.0 g/dL    Hct 38.4 37.0 - 47.0 %    .1 (H) 80.0 - 94.0 fL    MCH 34.3 pg    MCHC 32.3 (L) 33.0 - 36.0 g/dL    RDW 15.3 11.5 - 17.0 %    Platelet 101 (L) 130 - 400 x10(3)/mcL    MPV 11.0 (H) 7.4 - 10.4 fL    Neut % 67.7 %    Lymph % 17.2 %    Mono % 13.0 %    Eos % 1.3 %    Basophil % 0.4 %    Lymph # 1.19 0.6 - 4.6 x10(3)/mcL    Neut # 4.69 2.1 - 9.2 x10(3)/mcL    Mono # 0.90 0.1 - 1.3 x10(3)/mcL    Eos # 0.09 0 - 0.9 x10(3)/mcL    Baso # 0.03 0 - 0.2 x10(3)/mcL    IG# 0.03 0 - 0.04 x10(3)/mcL    IG% 0.4 %    NRBC% 0.0 %   Blood Smear Microscopic Exam    Collection Time: 03/05/23 10:03 PM   Result Value Ref Range    RBC Morph Abnormal (A) Normal    Macrocyte 1+ (A) (none)    Stippled RBC 1+ (A) (none)    Platelet Est Decreased (A) Normal, Adequate   Comprehensive metabolic panel    Collection Time: 03/06/23  6:11 AM   Result Value Ref Range    Sodium Level 131 (L) 136 - 145 mmol/L    Potassium Level 3.7 3.5 - 5.1 mmol/L    Chloride 92 (L) 98 - 107 mmol/L    Carbon Dioxide 29 23 - 31 mmol/L    Glucose Level 116 (H) 82 - 115 mg/dL    Blood Urea Nitrogen 25.2 (H) 9.8 - 20.1 mg/dL    Creatinine 1.01 0.55 - 1.02 mg/dL    Calcium Level Total 8.3 (L) 8.4 - 10.2 mg/dL    Protein Total 5.8 5.8 - 7.6 gm/dL    Albumin Level 2.6 (L) 3.4 - 4.8 g/dL    Globulin 3.2 2.4 - 3.5 gm/dL    Albumin/Globulin Ratio 0.8 (L) 1.1 - 2.0 ratio    Bilirubin Total 3.6 (H) <=1.5 mg/dL    Alkaline Phosphatase 94 40 - 150 unit/L    Alanine Aminotransferase 18 0 - 55 unit/L    Aspartate Aminotransferase 45 (H) 5 - 34 unit/L    eGFR 53 mls/min/1.73/m2   CBC with Differential    Collection Time: 03/06/23  6:11 AM   Result Value Ref Range    WBC 6.9 4.5 - 11.5 x10(3)/mcL    RBC 3.25 (L) 4.20 - 5.40 x10(6)/mcL    Hgb 11.3 (L) 12.0 - 16.0 g/dL    Hct 34.3 (L) 37.0 - 47.0 %    .5 (H) 80.0 - 94.0 fL    MCH 34.8 pg    MCHC 32.9 (L) 33.0 - 36.0 g/dL    RDW 15.3 11.5 - 17.0 %    Platelet 86  (L) 130 - 400 x10(3)/mcL    MPV 11.3 (H) 7.4 - 10.4 fL    Neut % 83.0 %    Lymph % 9.1 %    Mono % 7.4 %    Eos % 0.0 %    Basophil % 0.1 %    Lymph # 0.63 0.6 - 4.6 x10(3)/mcL    Neut # 5.74 2.1 - 9.2 x10(3)/mcL    Mono # 0.51 0.1 - 1.3 x10(3)/mcL    Eos # 0.00 0 - 0.9 x10(3)/mcL    Baso # 0.01 0 - 0.2 x10(3)/mcL    IG# 0.03 0 - 0.04 x10(3)/mcL    IG% 0.4 %    NRBC% 0.0 %   D-Dimer, Quantitative    Collection Time: 03/06/23  7:28 AM   Result Value Ref Range    D-Dimer 1.78 (H) 0.00 - 0.50 ug/mL FEU   Basic Metabolic Panel    Collection Time: 03/07/23  6:35 AM   Result Value Ref Range    Sodium Level 132 (L) 136 - 145 mmol/L    Potassium Level 3.6 3.5 - 5.1 mmol/L    Chloride 93 (L) 98 - 107 mmol/L    Carbon Dioxide 31 23 - 31 mmol/L    Glucose Level 109 82 - 115 mg/dL    Blood Urea Nitrogen 29.3 (H) 9.8 - 20.1 mg/dL    Creatinine 1.02 0.55 - 1.02 mg/dL    BUN/Creatinine Ratio 29     Calcium Level Total 8.6 8.4 - 10.2 mg/dL    Anion Gap 8.0 mEq/L    eGFR 53 mls/min/1.73/m2   Magnesium    Collection Time: 03/07/23  6:35 AM   Result Value Ref Range    Magnesium Level 1.80 1.60 - 2.60 mg/dL     Telemetry:  Sinus Rhythm    Physical Exam  Vitals and nursing note reviewed.   Cardiovascular:      Rate and Rhythm: Normal rate and regular rhythm.     Limited Physical Exam in the Setting of COVID-19 Infection.    Current Inpatient Medications:    Current Facility-Administered Medications:     aspirin EC tablet 81 mg, 81 mg, Oral, QAM, Andre Navarro MD, 81 mg at 03/07/23 0647    azilsartan med-chlorthalidone 40-12.5 mg Tab 1 tablet, 1 tablet, Oral, Daily, Andre Navarro MD    dexAMETHasone tablet 6 mg, 6 mg, Oral, Daily, Andre Navarro MD, 6 mg at 03/07/23 0920    dextromethorphan-guaiFENesin  mg/5 ml liquid 10 mL, 10 mL, Oral, Q4H PRN, Del Washington MD, 10 mL at 03/07/23 0647    furosemide injection 40 mg, 40 mg, Intravenous, Once, PEE Agustin    hydrOXYzine pamoate capsule 25 mg, 25 mg,  Oral, TID PRN, Andre Navarro MD    magnesium sulfate 2g in water 50mL IVPB (premix), 2 g, Intravenous, Once, PEE Agustin    melatonin tablet 6 mg, 6 mg, Oral, Nightly PRN, Andre Navarro MD    metoprolol tartrate (LOPRESSOR) split tablet 12.5 mg, 12.5 mg, Oral, BID, Andre Navarro MD, 12.5 mg at 03/07/23 0920    potassium chloride SA CR tablet 20 mEq, 20 mEq, Oral, Once, PEE Agustin    [COMPLETED] remdesivir 200 mg in sodium chloride 0.9% 250 mL infusion, 200 mg, Intravenous, Q24H, Stopped at 03/06/23 0414 **FOLLOWED BY** remdesivir 100 mg in sodium chloride 0.9% 100 mL infusion, 100 mg, Intravenous, Daily, Andre Navarro MD, Last Rate: 200 mL/hr at 03/07/23 0920, 100 mg at 03/07/23 0920    rivaroxaban tablet 20 mg, 20 mg, Oral, with dinner, Andre Navarro MD, 20 mg at 03/06/23 1645    sodium chloride 0.9% flush 10 mL, 10 mL, Intravenous, PRN, Andre Navarro MD    VTE Risk Mitigation (From admission, onward)           Ordered     rivaroxaban tablet 20 mg  with dinner         03/06/23 0254     IP VTE HIGH RISK PATIENT  Once         03/06/23 0254     Place sequential compression device  Until discontinued         03/06/23 0254                  Assessment:   Acute on Chronic Heart Failure with Preserved EF with Right Heart Failure- On 2 L/Min NC Oxygen Support    - EF 50%    - Small Right Pleural Effusion  Valvular Heart Disease    - TR (Severe), MR (Moderate)  PAF    - On Xarelto  Hypertension (Stable)  Sick Sinus Syndrome Status Post Dual Chamber Pacemaker (Medtronic)    - Prolonged AV Delay- Rep Interrogation Reviewed- Will keep current Settings as per Dr. Richard (3.6.23)  Pulmonary Hypertension  Acute COVID-19 Infection    - On Remdesivir  Thrombocytopenia  Venous Insufficiency with Lymphedema  Chronic Kidney Disease Stage IV- Stable/Improved    Plan:   Continue Diuresis as Clinically Indicated; Ensure Accurate I/O & Daily Weights.  Continue Current Cardiac  Medications  On Xarelto for Stroke Risk Reduction  Replete Mag  Medtronic Rep Consulted and Interrogation Performed with Results Relayed to Dr. Richard- Keep Current Settings.  Supportive Care as per Primary Team  Follow up with [facility].  Will be available. Please call if needed.    Alejandro Cedeno MD  Cardiology  [facility] - 4th Floor Medical Telemetry  03/07/2023

## 2023-03-07 NOTE — PROGRESS NOTES
[facility]  Hospital Medicine Progress Note        Chief Complaint: Inpatient Follow-up for     HPI:  89-year-old female with past medical history of CHF (no echocardiogram on record), AFib on Xarelto and hypertension who presented to the ER for persistent severe cough and shortness of breath.  Patient reports she is been seen in the Cardiology Clinic 3 days in a row received IV Lasix all visits but despite this has continued to need supplemental oxygen since Thursday.  Prior to Thursday she is not required oxygen.  Due to persistent symptoms she was brought to the ER tonight for further evaluation.      On arrival to the ER she was afebrile, hemodynamically stable maintaining sats in the mid 90s on 3 L supplemental oxygen.  Laboratory work was significant for a BNP of 1000.  SARS-COVID-2 testing was positive.  Chest x-ray showed vascular congestion.  Patient reports being vaccinated but not boosted for COVID-19.  Hospitalist was consulted for admission and she was given a dose of IV Lasix.    Interval Hx:   Patient seen and examined this morning on 3 L nasal cannula nursing staff bedside    Objective/physical exam:  General: In no acute distress, afebrile  Chest:  Nonlabored   Heart: RRR, +S1, S2, Abdomen: Soft, nontender, BS +  MSK: Warm,   Neurologic: Alert and oriented x4,   VITAL SIGNS: 24 HRS MIN & MAX LAST   Temp  Min: 97.3 °F (36.3 °C)  Max: 97.7 °F (36.5 °C) 97.5 °F (36.4 °C)   BP  Min: 108/66  Max: 147/78 (!) 147/78     Pulse  Min: 60  Max: 66  62   Resp  Min: 14  Max: 28 20   SpO2  Min: 94 %  Max: 98 % 98 %       Recent Labs   Lab 03/05/23 2203 03/06/23  0611   WBC 6.9 6.9   RBC 3.62* 3.25*   HGB 12.4 11.3*   HCT 38.4 34.3*   .1* 105.5*   MCH 34.3 34.8   MCHC 32.3* 32.9*   RDW 15.3 15.3   * 86*   MPV 11.0* 11.3*       Recent Labs   Lab 03/05/23 2203 03/06/23  0611 03/07/23  0635   * 131* 132*   K 4.1 3.7 3.6   CO2 31 29 31   BUN 25.0* 25.2* 29.3*    CREATININE 1.23* 1.01 1.02   CALCIUM 8.6 8.3* 8.6   MG  --   --  1.80   ALBUMIN 3.0* 2.6*  --    ALKPHOS 110 94  --    ALT 21 18  --    AST 54* 45*  --    BILITOT 3.1* 3.6*  --           Microbiology Results (last 7 days)       ** No results found for the last 168 hours. **             See below for Radiology    Scheduled Med:   aspirin  81 mg Oral QAM    azilsartan med-chlorthalidone  1 tablet Oral Daily    dexAMETHasone  6 mg Oral Daily    furosemide (LASIX) injection  40 mg Intravenous Q12H    metoprolol tartrate  12.5 mg Oral BID    remdesivir infusion  100 mg Intravenous Daily    rivaroxaban  20 mg Oral with dinner        Continuous Infusions:       PRN Meds:  dextromethorphan-guaiFENesin  mg/5 ml, hydrOXYzine pamoate, melatonin, sodium chloride 0.9%       Assessment/Plan:  COVID 19  Acute on chronic heart failure with preserved EF  Small right-sided pleural effusion   Valvular heart disease with severe TR and MR   Paroxysmal AFib   Sick sinus syndrome status post pacemaker placement  Pulmonary hypertension   Venous insufficiency     Continue with remdesivir and dexamethasone  D-dimer was 1.5 we had ordered venous ultrasound that was negative for acute DVT patient is already on full dose of Xarelto  strict input and output cardiology following  We will continue to wean her off the oxygen as tolerated  Pacemaker was interrogated by Cardiology   Home medications have been resumed  Will consult PT and OT  Continue with other home medications that includes as losartan and chlorthalidone, metoprolol, aspirin, Xarelto   Patient was given a dose of Lasix today  Will see how patient creatinine is tomorrow    Prophylaxis:  Xarelto   Isolation precaution as per COVID-19 protocol      Patient condition:  Stable/Fair/Guarded/ Serious/ Critical    Anticipated discharge and Disposition:         All diagnosis and differential diagnosis have been reviewed; assessment and plan has been documented; I have personally  reviewed the labs and test results that are presently available; I have reviewed the patients medication list; I have reviewed the consulting providers response and recommendations. I have reviewed or attempted to review medical records based upon their availability    All of the patient's questions have been  addressed and answered. Patient's is agreeable to the above stated plan. I will continue to monitor closely and make adjustments to medical management as needed.  _____________________________________________________________________    Nutrition Status:    Radiology:  X-Ray Chest AP Portable  Narrative: EXAMINATION:  XR CHEST AP PORTABLE    CLINICAL HISTORY:  shortness of breath;    TECHNIQUE:  Single view of the chest    COMPARISON:  No prior imaging available for comparison.    FINDINGS:  No focal opacification, pleural effusion, or pneumothorax.    The cardiomediastinal silhouette remains enlarged.  Left-sided cardiac device remains.    No acute osseous abnormality.  Impression: No acute cardiopulmonary process.    Electronically signed by: Perry Ba  Date:    03/06/2023  Time:    10:46      Pearl Pulliam MD   03/07/2023

## 2023-03-08 LAB
ALBUMIN SERPL-MCNC: 2.9 G/DL (ref 3.4–4.8)
ALBUMIN/GLOB SERPL: 0.8 RATIO (ref 1.1–2)
ALP SERPL-CCNC: 102 UNIT/L (ref 40–150)
ALT SERPL-CCNC: 21 UNIT/L (ref 0–55)
AST SERPL-CCNC: 46 UNIT/L (ref 5–34)
BILIRUBIN DIRECT+TOT PNL SERPL-MCNC: 2.5 MG/DL
BUN SERPL-MCNC: 33.9 MG/DL (ref 9.8–20.1)
CALCIUM SERPL-MCNC: 8.9 MG/DL (ref 8.4–10.2)
CHLORIDE SERPL-SCNC: 91 MMOL/L (ref 98–107)
CO2 SERPL-SCNC: 28 MMOL/L (ref 23–31)
CREAT SERPL-MCNC: 1.07 MG/DL (ref 0.55–1.02)
GFR SERPLBLD CREATININE-BSD FMLA CKD-EPI: 50 MLS/MIN/1.73/M2
GLOBULIN SER-MCNC: 3.7 GM/DL (ref 2.4–3.5)
GLUCOSE SERPL-MCNC: 108 MG/DL (ref 82–115)
POTASSIUM SERPL-SCNC: 4.2 MMOL/L (ref 3.5–5.1)
PROT SERPL-MCNC: 6.6 GM/DL (ref 5.8–7.6)
SODIUM SERPL-SCNC: 130 MMOL/L (ref 136–145)

## 2023-03-08 PROCEDURE — 97166 OT EVAL MOD COMPLEX 45 MIN: CPT

## 2023-03-08 PROCEDURE — 27000221 HC OXYGEN, UP TO 24 HOURS

## 2023-03-08 PROCEDURE — 63600175 PHARM REV CODE 636 W HCPCS: Mod: JZ,TB | Performed by: INTERNAL MEDICINE

## 2023-03-08 PROCEDURE — 97162 PT EVAL MOD COMPLEX 30 MIN: CPT

## 2023-03-08 PROCEDURE — 25000003 PHARM REV CODE 250: Performed by: EMERGENCY MEDICINE

## 2023-03-08 PROCEDURE — 80053 COMPREHEN METABOLIC PANEL: CPT | Performed by: INTERNAL MEDICINE

## 2023-03-08 PROCEDURE — 21400001 HC TELEMETRY ROOM

## 2023-03-08 PROCEDURE — 27000207 HC ISOLATION

## 2023-03-08 PROCEDURE — 25000003 PHARM REV CODE 250: Performed by: INTERNAL MEDICINE

## 2023-03-08 PROCEDURE — 25000242 PHARM REV CODE 250 ALT 637 W/ HCPCS: Performed by: INTERNAL MEDICINE

## 2023-03-08 RX ADMIN — METOPROLOL TARTRATE 12.5 MG: 25 TABLET, FILM COATED ORAL at 08:03

## 2023-03-08 RX ADMIN — DEXAMETHASONE 6 MG: 2 TABLET ORAL at 09:03

## 2023-03-08 RX ADMIN — ASPIRIN 81 MG: 81 TABLET, COATED ORAL at 06:03

## 2023-03-08 RX ADMIN — REMDESIVIR 100 MG: 100 INJECTION, POWDER, LYOPHILIZED, FOR SOLUTION INTRAVENOUS at 09:03

## 2023-03-08 RX ADMIN — RIVAROXABAN 20 MG: 10 TABLET, FILM COATED ORAL at 03:03

## 2023-03-08 RX ADMIN — GUAIFENESIN SYRUP AND DEXTROMETHORPHAN 10 ML: 100; 10 SYRUP ORAL at 08:03

## 2023-03-08 RX ADMIN — METOPROLOL TARTRATE 12.5 MG: 25 TABLET, FILM COATED ORAL at 09:03

## 2023-03-08 NOTE — PROGRESS NOTES
"Inpatient Nutrition Evaluation    Admit Date: 3/5/2023   Total duration of encounter: 3 days    Nutrition Recommendation/Prescription     Continue oral diet as tolerated.    Nutrition Assessment     Chart Review    Reason Seen: continuous nutrition monitoring  Malnutrition Screening Tool Results   Have you recently lost weight without trying?: No  Have you been eating poorly because of a decreased appetite?: No   MST Score: 0     Diagnosis: COVID19, heart failure, pleural effusion, valvular heart disease, SSS s/p pacemaker, venous insufficiency  Relevant Medical History: CHF, atrial fibrillation, HTN  Nutrition-Related Medications: dexamethasone  Nutrition-Related Labs: 3/8 Na 130, Cl 91, GFR 50, Alb 2.9    Diet Order: Diet Low Sodium, 2gm  Oral Supplement Order: none  Appetite/Oral Intake: good/% of meals  Factors Affecting Nutritional Intake: none identified  Food/Druze/Cultural Preferences: none reported  Food Allergies: none reported       Wound(s):   buttocks    Comments    3/8/23 No weight/appetite loss per malnutrition screening tool.    Anthropometrics    Height: 4' 11" (149.9 cm) Height Method: Stated  Last Weight: 75.3 kg (166 lb 0.1 oz) (03/07/23 0127) Weight Method: Bed Scale  BMI (Calculated): 33.5  BMI Classification: obese grade I (BMI 30-34.9)     Ideal Body Weight (IBW), Female: 95 lb     % Ideal Body Weight, Female (lb): 174.75 %                             Usual Weight Provided By: patient denies unintentional weight loss    Wt Readings from Last 5 Encounters:   03/07/23 75.3 kg (166 lb 0.1 oz)     Weight Change(s) Since Admission: 1.8 kg weight change in 2 days, possibly fluid-related versus error  Wt Readings from Last 1 Encounters:   03/07/23 0127 75.3 kg (166 lb 0.1 oz)   03/05/23 2126 77.1 kg (170 lb)   Admit Weight: 77.1 kg (170 lb) (03/05/23 2126)    Patient Education Not applicable.    Monitoring & Evaluation     Dietitian will monitor food and beverage intake.  Nutrition " Risk/Follow-Up: low (follow-up in 5-7 days)  Patients assigned 'low nutrition risk' status do not qualify for a full nutritional assessment but will be monitored and re-evaluated in a 5-7 day time period. Please consult if re-evaluation needed sooner.

## 2023-03-08 NOTE — PLAN OF CARE
03/08/23 1304   Discharge Assessment   Assessment Type Discharge Planning Assessment   Source of Information health record   Communicated SARA with patient/caregiver Date not available/Unable to determine   Reason For Admission Shortness of Breath   People in Home facility resident   Facility Arrived From: SNF: [facility]   Discharge Plan A Skilled Nursing Facility   Discharge Plan B Skilled Nursing Facility   OTHER   Name(s) of People in Home Patient currently at SNF ([facility])

## 2023-03-08 NOTE — PLAN OF CARE
Problem: Occupational Therapy  Goal: Occupational Therapy Goal  Description: Goals to be met by:  3/22/2023    Patient will increase functional independence with ADLs by performing:    UE Dressing with Modified [place].  LE Dressing with Modified [place].  Grooming while standing with Modified [place].  Toileting from bedside commode with Modified Whitley for hygiene and clothing management.   Toilet transfer to bedside commode with Modified Whitley.    Outcome: Ongoing, Progressing

## 2023-03-08 NOTE — PROGRESS NOTES
[facility] 4th Floor Medical Telemetry  Wound Care    Patient Name:  Ana Lake   MRN:  [id number]  Date: 3/8/2023  Diagnosis: Acute on chronic congestive heart failure    History:     No past medical history on file.    Social History     Socioeconomic History    Marital status:        Precautions:     Allergies as of 03/05/2023 - Reviewed 03/05/2023   Allergen Reaction Noted    Opioids - morphine analogues Anxiety 03/05/2023       WOC Assessment Details/Treatment      03/08/23 1115   Skin Interventions   Pressure Reduction Devices specialty bed utilized;positioning supports utilized     WOCN follow up visit related to consult 03/07/23 for sacrum; specialty bed delivered, foam wedge delivered.  Care Recommendations:   Continue head to toe skin assessment q 12 hours;  Continue turning/repositioning q 2 hours or schedule to prevent erythema;  Continue with specialty bed and wedge for 30 degree pelvic tilt;  Keep sacral area clean and dry;  Avoid foam dressings on sacrum;  Avoid adult briefs;  Encourage activity as ordered;  Ensure adequate nutrition/hydration for healing;

## 2023-03-08 NOTE — PROGRESS NOTES
[facility] Hospital Medicine Progress Note        Chief Complaint: Inpatient Follow-up for     HPI:  89-year-old female with past medical history of CHF (no echocardiogram on record), AFib on Xarelto and hypertension who presented to the ER for persistent severe cough and shortness of breath.  Patient reports she is been seen in the Cardiology Clinic 3 days in a row received IV Lasix all visits but despite this has continued to need supplemental oxygen since Thursday.  Prior to Thursday she is not required oxygen.  Due to persistent symptoms she was brought to the ER tonight for further evaluation.      On arrival to the ER she was afebrile, hemodynamically stable maintaining sats in the mid 90s on 3 L supplemental oxygen.  Laboratory work was significant for a BNP of 1000.  SARS-COVID-2 testing was positive.  Chest x-ray showed vascular congestion.  Patient reports being vaccinated but not boosted for COVID-19.  Hospitalist was consulted for admission and she was given a dose of IV Lasix.    Interval Hx:   Patient seen and examined this morning on 3 L nasal cannula no other issues reported.  Patient reports she had a good night sleep      Objective/physical exam:  General: In no acute distress, afebrile  Chest:  Nonlabored   Heart: RRR, +S1, S2, Abdomen: Soft, nontender, BS +  MSK: Warm,   Neurologic: Alert and oriented x4,   VITAL SIGNS: 24 HRS MIN & MAX LAST   Temp  Min: 97.5 °F (36.4 °C)  Max: 97.8 °F (36.6 °C) 97.6 °F (36.4 °C)   BP  Min: 127/75  Max: 162/93 133/76     Pulse  Min: 60  Max: 73  60   Resp  Min: 20  Max: 24 20   SpO2  Min: 94 %  Max: 97 % 97 %       Recent Labs   Lab 03/05/23 2203 03/06/23  0611   WBC 6.9 6.9   RBC 3.62* 3.25*   HGB 12.4 11.3*   HCT 38.4 34.3*   .1* 105.5*   MCH 34.3 34.8   MCHC 32.3* 32.9*   RDW 15.3 15.3   * 86*   MPV 11.0* 11.3*       Recent Labs   Lab 03/05/23 2203 03/06/23  0611 03/07/23  0635 03/08/23  0411   * 131* 132*  130*   K 4.1 3.7 3.6 4.2   CO2 31 29 31 28   BUN 25.0* 25.2* 29.3* 33.9*   CREATININE 1.23* 1.01 1.02 1.07*   CALCIUM 8.6 8.3* 8.6 8.9   MG  --   --  1.80  --    ALBUMIN 3.0* 2.6*  --  2.9*   ALKPHOS 110 94  --  102   ALT 21 18  --  21   AST 54* 45*  --  46*   BILITOT 3.1* 3.6*  --  2.5*          Microbiology Results (last 7 days)       ** No results found for the last 168 hours. 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   Paroxysmal AFib   Sick sinus syndrome status post pacemaker placement  Pulmonary hypertension   Venous insufficiency       Symptomatically better  Continue with remdesivir and dexamethasone  D-dimer was 1.5 we had ordered venous ultrasound that was negative for acute DVT patient is already on full dose of Xarelto  strict input and output cardiology following  We will continue to wean her off the oxygen as tolerated  Pacemaker was interrogated by Cardiology   Home medications have been resumed  Continue with other home medications that includes as losartan and chlorthalidone, metoprolol, aspirin, Xarelto   Will consult physical and occupational therapy and see how patient is doing with them   Continue to wean her off the oxygen  Prophylaxis:  Xarelto   Isolation precaution as per COVID-19 protocol      Patient condition:  Stable/Fair/Guarded/ Serious/ Critical    Anticipated discharge and Disposition:         All diagnosis and differential  diagnosis have been reviewed; assessment and plan has been documented; I have personally reviewed the labs and test results that are presently available; I have reviewed the patients medication list; I have reviewed the consulting providers response and recommendations. I have reviewed or attempted to review medical records based upon their availability    All of the patient's questions have been  addressed and answered. Patient's is agreeable to the above stated plan. I will continue to monitor closely and make adjustments to medical management as needed.  _____________________________________________________________________    Nutrition Status:    Radiology:  X-Ray Chest AP Portable  Narrative: EXAMINATION:  XR CHEST AP PORTABLE    CLINICAL HISTORY:  shortness of breath;    TECHNIQUE:  Single view of the chest    COMPARISON:  No prior imaging available for comparison.    FINDINGS:  No focal opacification, pleural effusion, or pneumothorax.    The cardiomediastinal silhouette remains enlarged.  Left-sided cardiac device remains.    No acute osseous abnormality.  Impression: No acute cardiopulmonary process.    Electronically signed by: Perry Ba  Date:    03/06/2023  Time:    10:46      Pearl Pulliam MD   03/08/2023

## 2023-03-08 NOTE — PT/OT/SLP EVAL
Occupational Therapy   Evaluation    Name: Ana Lake  MRN: [id number]  Admitting Diagnosis: Acute on chronic congestive heart failure  Recent Surgery: * No surgery found *      Recommendations:     Discharge Recommendations: rehabilitation facility  Discharge Equipment Recommendations:     Barriers to discharge:       Assessment:     Ana Lake is a 89 y.o. female with a medical diagnosis of COVID, Acute on chronic congestive heart failure.  She presents with the following performance deficits affecting function: weakness, impaired endurance, impaired self care skills, impaired functional mobility, gait instability, impaired balance, impaired cognition.  Pt carye historian, reports she lives at rehab but then states [facility]. Today she was able to ambulate to chair on 3 liters with min A with RW. 02 maintained in 90s, HR increased to 130s.    Rehab Prognosis: Good; patient would benefit from acute skilled OT services to address these deficits and reach maximum level of function.       Plan:     Patient to be seen 3 x/week, 5 x/week to address the above listed problems via self-care/home management, therapeutic activities, therapeutic exercises  Plan of Care Expires:    Plan of Care Reviewed with:      Subjective       Occupational Profile:  Living Environment: SNF?   Previous level of function: assist with ADLs   Roles and Routines: -  Equipment Used at Home:    Assistance upon Discharge: SNF?     Pain/Comfort:  Pain Rating 1: 0/10    Patients cultural, spiritual, Pentec[employer]al conflicts given the current situation:      Objective:     Communicated with: nrsg prior to session.  Patient found HOB elevated with   upon OT entry to room.    General Precautions: droplet, contact (covid)   Orthopedic Precautions:    Braces:    Respiratory Status: Nasal cannula, flow 3 L/min    Occupational Performance:    Bed Mobility:    Patient completed Supine to Sit with minimum assistance    Functional  Mobility/Transfers:  Patient completed Bed <> Chair Transfer using Step Transfer technique with minimum assistance with rolling walker  Functional Mobility: 4 steps to chair with RW; no LOB    Activities of Daily Living:  Lower Body Dressing: maximal assistance    Toileting: moderate assistance      Cognitive/Visual Perceptual:  Cognitive/Psychosocial Skills:     -       Oriented to: Person and not oriented to place/month/year      Physical Exam:  Upper Extremity Strength:    -       Right Upper Extremity: WFL  -       Left Upper Extremity: WFL    AMPAC 6 Click ADL:  [facility] Total Score:      Treatment & Education:  Educated pt on call bell use.     Patient left up in chair with all lines intact, call button in reach, and nrsg and CNA notified    GOALS:   Multidisciplinary Problems       Occupational Therapy Goals          Problem: Occupational Therapy    Goal Priority Disciplines Outcome Interventions   Occupational Therapy Goal     OT, PT/OT Ongoing, Progressing    Description: Goals to be met by:  3/22/2023    Patient will increase functional independence with ADLs by performing:    UE Dressing with Modified [place].  LE Dressing with Modified [place].  Grooming while standing with Modified [place].  Toileting from bedside commode with Modified [place] for hygiene and clothing management.   Toilet transfer to bedside commode with Modified [place].                         History:     No past medical history on file.    No past surgical history on file.    Time Tracking:     OT Date of Treatment:    OT Start Time: 1004  OT Stop Time: 1033  OT Total Time (min): 29 min    Billable Minutes:Evaluation Moderate Complexity     3/8/2023

## 2023-03-09 LAB
ANION GAP SERPL CALC-SCNC: 11 MEQ/L
BASOPHILS # BLD AUTO: 0.01 X10(3)/MCL (ref 0–0.2)
BASOPHILS NFR BLD AUTO: 0.1 %
BUN SERPL-MCNC: 34 MG/DL (ref 9.8–20.1)
CALCIUM SERPL-MCNC: 8.9 MG/DL (ref 8.4–10.2)
CHLORIDE SERPL-SCNC: 88 MMOL/L (ref 98–107)
CO2 SERPL-SCNC: 30 MMOL/L (ref 23–31)
CREAT SERPL-MCNC: 0.91 MG/DL (ref 0.55–1.02)
CREAT/UREA NIT SERPL: 37
EOSINOPHIL # BLD AUTO: 0 X10(3)/MCL (ref 0–0.9)
EOSINOPHIL NFR BLD AUTO: 0 %
ERYTHROCYTE [DISTWIDTH] IN BLOOD BY AUTOMATED COUNT: 14.7 % (ref 11.5–17)
GFR SERPLBLD CREATININE-BSD FMLA CKD-EPI: 60 MLS/MIN/1.73/M2
GLUCOSE SERPL-MCNC: 116 MG/DL (ref 82–115)
HCT VFR BLD AUTO: 39.6 % (ref 37–47)
HGB BLD-MCNC: 13.4 G/DL (ref 12–16)
IMM GRANULOCYTES # BLD AUTO: 0.02 X10(3)/MCL (ref 0–0.04)
IMM GRANULOCYTES NFR BLD AUTO: 0.2 %
LYMPHOCYTES # BLD AUTO: 0.73 X10(3)/MCL (ref 0.6–4.6)
LYMPHOCYTES NFR BLD AUTO: 9 %
MCH RBC QN AUTO: 34.8 PG
MCHC RBC AUTO-ENTMCNC: 33.8 G/DL (ref 33–36)
MCV RBC AUTO: 102.9 FL (ref 80–94)
MONOCYTES # BLD AUTO: 0.69 X10(3)/MCL (ref 0.1–1.3)
MONOCYTES NFR BLD AUTO: 8.5 %
NEUTROPHILS # BLD AUTO: 6.63 X10(3)/MCL (ref 2.1–9.2)
NEUTROPHILS NFR BLD AUTO: 82.2 %
NRBC BLD AUTO-RTO: 0 %
PLATELET # BLD AUTO: 107 X10(3)/MCL (ref 130–400)
PMV BLD AUTO: 11.4 FL (ref 7.4–10.4)
POCT GLUCOSE: 147 MG/DL (ref 70–110)
POTASSIUM SERPL-SCNC: 4.3 MMOL/L (ref 3.5–5.1)
RBC # BLD AUTO: 3.85 X10(6)/MCL (ref 4.2–5.4)
SODIUM SERPL-SCNC: 129 MMOL/L (ref 136–145)
WBC # SPEC AUTO: 8.1 X10(3)/MCL (ref 4.5–11.5)

## 2023-03-09 PROCEDURE — 25000003 PHARM REV CODE 250: Performed by: INTERNAL MEDICINE

## 2023-03-09 PROCEDURE — 63600175 PHARM REV CODE 636 W HCPCS: Performed by: INTERNAL MEDICINE

## 2023-03-09 PROCEDURE — 27000207 HC ISOLATION

## 2023-03-09 PROCEDURE — 97530 THERAPEUTIC ACTIVITIES: CPT | Mod: CQ

## 2023-03-09 PROCEDURE — 21400001 HC TELEMETRY ROOM

## 2023-03-09 PROCEDURE — 97535 SELF CARE MNGMENT TRAINING: CPT

## 2023-03-09 PROCEDURE — 25000003 PHARM REV CODE 250: Performed by: EMERGENCY MEDICINE

## 2023-03-09 PROCEDURE — 97116 GAIT TRAINING THERAPY: CPT | Mod: CQ

## 2023-03-09 PROCEDURE — 80048 BASIC METABOLIC PNL TOTAL CA: CPT | Performed by: INTERNAL MEDICINE

## 2023-03-09 PROCEDURE — 25000242 PHARM REV CODE 250 ALT 637 W/ HCPCS: Performed by: INTERNAL MEDICINE

## 2023-03-09 PROCEDURE — 85025 COMPLETE CBC W/AUTO DIFF WBC: CPT | Performed by: INTERNAL MEDICINE

## 2023-03-09 RX ORDER — GUAIFENESIN 600 MG/1
600 TABLET, EXTENDED RELEASE ORAL 2 TIMES DAILY
Status: DISCONTINUED | OUTPATIENT
Start: 2023-03-09 | End: 2023-03-15 | Stop reason: HOSPADM

## 2023-03-09 RX ORDER — LOSARTAN POTASSIUM 25 MG/1
25 TABLET ORAL DAILY
Status: DISCONTINUED | OUTPATIENT
Start: 2023-03-09 | End: 2023-03-15 | Stop reason: HOSPADM

## 2023-03-09 RX ORDER — BENZONATATE 100 MG/1
100 CAPSULE ORAL 3 TIMES DAILY PRN
Status: DISCONTINUED | OUTPATIENT
Start: 2023-03-09 | End: 2023-03-15 | Stop reason: HOSPADM

## 2023-03-09 RX ORDER — HYDRALAZINE HYDROCHLORIDE 20 MG/ML
10 INJECTION INTRAMUSCULAR; INTRAVENOUS EVERY 4 HOURS PRN
Status: DISCONTINUED | OUTPATIENT
Start: 2023-03-09 | End: 2023-03-15 | Stop reason: HOSPADM

## 2023-03-09 RX ORDER — FUROSEMIDE 10 MG/ML
20 INJECTION INTRAMUSCULAR; INTRAVENOUS
Status: DISCONTINUED | OUTPATIENT
Start: 2023-03-09 | End: 2023-03-10

## 2023-03-09 RX ADMIN — GUAIFENESIN 600 MG: 600 TABLET, EXTENDED RELEASE ORAL at 10:03

## 2023-03-09 RX ADMIN — RIVAROXABAN 20 MG: 10 TABLET, FILM COATED ORAL at 05:03

## 2023-03-09 RX ADMIN — ASPIRIN 81 MG: 81 TABLET, COATED ORAL at 06:03

## 2023-03-09 RX ADMIN — HYDRALAZINE HYDROCHLORIDE 10 MG: 20 INJECTION INTRAMUSCULAR; INTRAVENOUS at 08:03

## 2023-03-09 RX ADMIN — FUROSEMIDE 20 MG: 10 INJECTION, SOLUTION INTRAMUSCULAR; INTRAVENOUS at 12:03

## 2023-03-09 RX ADMIN — DEXAMETHASONE 6 MG: 2 TABLET ORAL at 09:03

## 2023-03-09 RX ADMIN — METOPROLOL TARTRATE 12.5 MG: 25 TABLET, FILM COATED ORAL at 09:03

## 2023-03-09 RX ADMIN — GUAIFENESIN SYRUP AND DEXTROMETHORPHAN 10 ML: 100; 10 SYRUP ORAL at 12:03

## 2023-03-09 RX ADMIN — METOPROLOL TARTRATE 12.5 MG: 25 TABLET, FILM COATED ORAL at 08:03

## 2023-03-09 RX ADMIN — GUAIFENESIN SYRUP AND DEXTROMETHORPHAN 10 ML: 100; 10 SYRUP ORAL at 08:03

## 2023-03-09 RX ADMIN — LOSARTAN POTASSIUM 25 MG: 25 TABLET, FILM COATED ORAL at 05:03

## 2023-03-09 RX ADMIN — HYDROXYZINE PAMOATE 25 MG: 25 CAPSULE ORAL at 08:03

## 2023-03-09 RX ADMIN — REMDESIVIR 100 MG: 100 INJECTION, POWDER, LYOPHILIZED, FOR SOLUTION INTRAVENOUS at 09:03

## 2023-03-09 RX ADMIN — Medication 6 MG: at 08:03

## 2023-03-09 RX ADMIN — BENZONATATE 100 MG: 100 CAPSULE ORAL at 08:03

## 2023-03-09 NOTE — PT/OT/SLP PROGRESS
Occupational Therapy   Treatment    Name: Ana Lake  MRN: [id number]  Admitting Diagnosis:  Acute on chronic congestive heart failure       Recommendations:     Discharge Recommendations: nursing facility, skilled  Discharge Equipment Recommendations:     Barriers to discharge:       Assessment:     Ana Lake is a 89 y.o. female with a medical diagnosis of Acute on chronic congestive heart failure.  She presents with the following. Performance deficits affecting function are weakness, impaired endurance, impaired self care skills, impaired functional mobility, impaired balance, gait instability, decreased upper extremity function.     Rehab Prognosis:  Good; patient would benefit from acute skilled OT services to address these deficits and reach maximum level of function.       Plan:     Patient to be seen 3 x/week, 5 x/week to address the above listed problems via self-care/home management, therapeutic activities, therapeutic exercises  Plan of Care Expires:    Plan of Care Reviewed with: patient    Subjective     Pain/Comfort:  Pain Rating 1: 0/10    Objective:     Communicated with: nrsg prior to session.  Patient found HOB elevated with   upon OT entry to room.    General Precautions: Standard, droplet, contact    Orthopedic Precautions:   Braces:    Respiratory Status: Nasal cannula, flow 4 L/min     Occupational Performance:     Bed Mobility:    Patient completed Supine to Sit with minimum assistance     Functional Mobility/Transfers:  Patient completed Bed <> Chair Transfer using Step Transfer technique with minimum assistance with rolling walker  Functional Mobility: 4 steps to chair with RW    Activities of Daily Living:  Grooming: supervision sitting up in chair; pt performed oral care and hair brushing with no assist ; increased time  Toileting: maximal assistance for pericare following BM in bed; pt able to roll with SBA             Patient left up in chair with all lines intact, call  button in reach, and CNA notified    GOALS:   Multidisciplinary Problems       Occupational Therapy Goals          Problem: Occupational Therapy    Goal Priority Disciplines Outcome Interventions   Occupational Therapy Goal     OT, PT/OT Ongoing, Progressing    Description: Goals to be met by:  3/22/2023    Patient will increase functional independence with ADLs by performing:    UE Dressing with Modified [place].  LE Dressing with Modified [place].  Grooming while standing with Modified [place].  Toileting from bedside commode with Modified [place] for hygiene and clothing management.   Toilet transfer to bedside commode with Modified [place].                         Time Tracking:     OT Date of Treatment:    OT Start Time: 1025  OT Stop Time: 1048  OT Total Time (min): 23 min    Billable Minutes:Self Care/Home Management 23min    OT/ALIREZA: OT     ALIREZA Visit Number: 1    3/9/2023

## 2023-03-09 NOTE — PT/OT/SLP PROGRESS
Physical Therapy Treatment    Patient Name:  Ana Lake   MRN:  [id number]    Recommendations:     Discharge Recommendations: nursing facility, skilled, rehabilitation facility (pending progress)  Discharge Equipment Recommendations: none  Barriers to discharge:  medical condition, placement    Assessment:     Ana Lake is a 89 y.o. female admitted with a medical diagnosis of Acute on chronic congestive heart failure.  She presents with the following impairments/functional limitations: weakness, impaired endurance .  Pt sebastian tx well. Pt soiled in recliner chair upon arrival, increased urination d/t meds.     Rehab Prognosis: Good; patient would benefit from acute skilled PT services to address these deficits and reach maximum level of function.    Recent Surgery: * No surgery found *      Plan:     During this hospitalization, patient to be seen 6 x/week to address the identified rehab impairments via gait training, therapeutic activities, therapeutic exercises and progress toward the following goals:    Plan of Care Expires:  04/08/23    Subjective     Chief Complaint: fatigue  Patient/Family Comments/goals: get better  Pain/Comfort:         Objective:     Communicated with RN prior to session.  Patient found up in chair with oxygen, PureWick upon PT entry to room.     General Precautions: Standard, droplet, contact, airborne, fall (COVID-19)  Orthopedic Precautions: N/A  Braces: N/A  Respiratory Status: Nasal cannula, flow 4 L/min  Blood Pressure: 175/95 (post activity)  Skin Integrity:  redness on bottom      Functional Mobility:  Bed Mobility:     Sit to Supine: moderate assistance  Transfers:     Sit to Stand:  minimum assistance with rolling walker  Toilet Transfer: minimum assistance with  rolling walker  using  Step Transfer  Gait: Pt amb around room 1x then from EOB<>bathroom Denise with RW. Slow elana, step through gait pattern . Easily fatigued.    Therapeutic Activities/Exercises:  Pt sat EOB  for 5' the performed toilet TF Denise with RW. Pt stood in RW SBA while therapist assist with pericare. Requires increase time secondary to BM.    Education:  Patient provided with verbal education regarding POC.  Understanding was verbalized, however additional teaching warranted.     Patient left HOB elevated with all lines intact, call button in reach, and daughter present..    GOALS:   Multidisciplinary Problems       Physical Therapy Goals          Problem: Physical Therapy    Goal Priority Disciplines Outcome Goal Variances Interventions   Physical Therapy Goal    High PT, PT/OT Ongoing, Progressing     Description: Goals to be met by: 2023     Patient will increase functional independence with mobility by performin. Sit to stand transfer with Modified [place]  2. Gait  x 200 feet with Modified [place] using LRAD.  3. Supine<>sit with Modified [place]                         Time Tracking:     PT Received On: 23  PT Start Time: 1500     PT Stop Time: 1545  PT Total Time (min): 45 min     Billable Minutes: Gait Training 15 and Therapeutic Activity 30    Treatment Type: Treatment  PT/PTA: PTA     PTA Visit Number: 1     2023

## 2023-03-09 NOTE — PROGRESS NOTES
[facility] Hospital Medicine Progress Note        Chief Complaint: Inpatient Follow-up    HPI:   89-year-old female with significant history of CHF with unknown ejection fraction, PAF on Xarelto, HTN.  Patient is a nursing home resident.  Patient presented to the ED with complaints of severe cough, shortness of breath.  Patient was recently seen in Cardiology Clinic multiple times, received IV Lasix all visits.  But despite IV Lasix she is been hypoxemic and required oxygen at nursing home.  Due to persistent symptoms she was brought to the ED.  Patient was afebrile, hemodynamically stable with saturation in mid 90s on 3 L oxygen supplementation.  Chest x-ray consistent with vascular congestion.  COVID-19 PCR was positive.  Hospitalist team was consulted for admission.  Patient was initiated on IV Lasix, treatment protocol for COVID-19 and was admitted to hospitalist medicine service.  Cardiology was consulted.  EF 50% paid patient has valvular heart disease-severe TR and moderate MR.  Cardiology recommended to continue diuresis.  Patient has history of sick sinus syndrome for which she is status pacemaker placement.  Pacemaker interrogated, recommended to continue current settings.  Respiratory status slowly improving on diuresis and appropriate treatment for COVID-19 infection.    Interval Hx:   Patient seen at bedside.  She is comfortably laying in bed.  She is hard of hearing and therefore communication limited.  Slightly tachypneic intermittently.  Otherwise hemodynamics stable.  Respiratory status stable on nasal cannula with saturation in high 90s.    Objective/physical exam:  General: In no acute distress, afebrile  Chest: Clear to auscultation bilaterally  Heart: S1, S2, no appreciable murmur  Abdomen: Soft, nontender, BS +  MSK: Warm, no lower extremity edema, no clubbing or cyanosis  Neurologic:  Alert, awake and seems oriented  VITAL SIGNS: 24 HRS MIN & MAX LAST   Temp   Min: 97.3 °F (36.3 °C)  Max: 97.7 °F (36.5 °C) 97.4 °F (36.3 °C)   BP  Min: 126/69  Max: 161/74 (!) 151/85     Pulse  Min: 59  Max: 63  61   Resp  Min: 18  Max: 24 (!) 24     SpO2  Min: 96 %  Max: 99 % 98 %       Recent Labs   Lab 03/09/23  0417   WBC 8.1   RBC 3.85*   HGB 13.4   HCT 39.6   .9*   MCH 34.8   MCHC 33.8   RDW 14.7   *   MPV 11.4*         Recent Labs   Lab 03/07/23  0635 03/08/23  0411 03/09/23 0417   * 130* 129*   K 3.6 4.2 4.3   CO2 31 28 30   BUN 29.3* 33.9* 34.0*   CREATININE 1.02 1.07* 0.91   CALCIUM 8.6 8.9 8.9   MG 1.80  --   --    ALBUMIN  --  2.9*  --    ALKPHOS  --  102  --    ALT  --  21  --    AST  --  46*  --    BILITOT  --  2.5*  --           Microbiology Results (last 7 days)       ** No results found for the last 168 hours. **             Scheduled Med:   aspirin  81 mg Oral QAM    azilsartan med-chlorthalidone  1 tablet Oral Daily    dexAMETHasone  6 mg Oral Daily    metoprolol tartrate  12.5 mg Oral BID    remdesivir infusion  100 mg Intravenous Daily    rivaroxaban  20 mg Oral with dinner          Assessment/Plan:    Acute decompensated heart failure with preserved ejection fraction  COVID-19 infection   Acute on chronic hypoxemic respiratory failure secondary to both above  Acute on chronic hypervolemic hyponatremia  Valvular heart disease-severe TR/moderate MR  PAF on Xarelto   History of sick sinus syndrome status post pacemaker placement  Essential HTN-stable   Pulmonary hypertension   Chronic venous insufficiency  Prophylaxis    Chest x-ray repeated-small bilateral pleural effusion  I will restart diuresis Lasix 20 mg b.i.d.  Patient's respiratory status much better   Saturation in high 90s today   Appreciate Cardiology recommendations   Will need outpatient follow-up with Cardiology given valvular heart disease/possible right heart failure/pulmonary hypertension   Pacemaker interrogated this hospitalization-continue current settings  I will DC her  chlorthalidone since she is on Lasix   Continue ARB, beta-blocker   Continue aspirin   Hyper volemic hyponatremia expected to correct with diuresis, continue close monitoring  In case of worsening will consider tolvaptan  Continue treatment protocol for COVID 19 with remdesivir, Decadron  Continue isolation precautions   D-dimer was elevated, suspicion for PE less likely since the patient is already on Xarelto   I will obtain a venous ultrasound to rule out DVT  DVT prophylaxis-on Xarelto    Plan to DC back to nursing home once volume status stabilizes          Ml Monsalve MD   03/09/2023

## 2023-03-09 NOTE — PLAN OF CARE
Patient currently at [facility] at the [facility] for skilled services. Clinical updates sent.

## 2023-03-10 LAB
ALBUMIN SERPL-MCNC: 3 G/DL (ref 3.4–4.8)
ALBUMIN/GLOB SERPL: 0.8 RATIO (ref 1.1–2)
ALP SERPL-CCNC: 111 UNIT/L (ref 40–150)
ALT SERPL-CCNC: 26 UNIT/L (ref 0–55)
AST SERPL-CCNC: 54 UNIT/L (ref 5–34)
BASOPHILS # BLD AUTO: 0.01 X10(3)/MCL (ref 0–0.2)
BASOPHILS NFR BLD AUTO: 0.1 %
BILIRUBIN DIRECT+TOT PNL SERPL-MCNC: 2.9 MG/DL
BUN SERPL-MCNC: 39.4 MG/DL (ref 9.8–20.1)
CALCIUM SERPL-MCNC: 9 MG/DL (ref 8.4–10.2)
CHLORIDE SERPL-SCNC: 88 MMOL/L (ref 98–107)
CO2 SERPL-SCNC: 28 MMOL/L (ref 23–31)
CREAT SERPL-MCNC: 0.93 MG/DL (ref 0.55–1.02)
EOSINOPHIL # BLD AUTO: 0 X10(3)/MCL (ref 0–0.9)
EOSINOPHIL NFR BLD AUTO: 0 %
ERYTHROCYTE [DISTWIDTH] IN BLOOD BY AUTOMATED COUNT: 14.7 % (ref 11.5–17)
GFR SERPLBLD CREATININE-BSD FMLA CKD-EPI: 59 MLS/MIN/1.73/M2
GLOBULIN SER-MCNC: 4 GM/DL (ref 2.4–3.5)
GLUCOSE SERPL-MCNC: 121 MG/DL (ref 82–115)
HCT VFR BLD AUTO: 42.3 % (ref 37–47)
HGB BLD-MCNC: 14.3 G/DL (ref 12–16)
IMM GRANULOCYTES # BLD AUTO: 0.03 X10(3)/MCL (ref 0–0.04)
IMM GRANULOCYTES NFR BLD AUTO: 0.4 %
LYMPHOCYTES # BLD AUTO: 0.94 X10(3)/MCL (ref 0.6–4.6)
LYMPHOCYTES NFR BLD AUTO: 11 %
MCH RBC QN AUTO: 34.5 PG
MCHC RBC AUTO-ENTMCNC: 33.8 G/DL (ref 33–36)
MCV RBC AUTO: 101.9 FL (ref 80–94)
MONOCYTES # BLD AUTO: 0.86 X10(3)/MCL (ref 0.1–1.3)
MONOCYTES NFR BLD AUTO: 10.1 %
NEUTROPHILS # BLD AUTO: 6.71 X10(3)/MCL (ref 2.1–9.2)
NEUTROPHILS NFR BLD AUTO: 78.4 %
NRBC BLD AUTO-RTO: 0 %
PLATELET # BLD AUTO: 111 X10(3)/MCL (ref 130–400)
PMV BLD AUTO: 11.7 FL (ref 7.4–10.4)
POTASSIUM SERPL-SCNC: 4.2 MMOL/L (ref 3.5–5.1)
PROT SERPL-MCNC: 7 GM/DL (ref 5.8–7.6)
RBC # BLD AUTO: 4.15 X10(6)/MCL (ref 4.2–5.4)
SODIUM SERPL-SCNC: 126 MMOL/L (ref 136–145)
WBC # SPEC AUTO: 8.6 X10(3)/MCL (ref 4.5–11.5)

## 2023-03-10 PROCEDURE — 63600175 PHARM REV CODE 636 W HCPCS: Mod: JZ,TB | Performed by: INTERNAL MEDICINE

## 2023-03-10 PROCEDURE — C1751 CATH, INF, PER/CENT/MIDLINE: HCPCS

## 2023-03-10 PROCEDURE — 63600175 PHARM REV CODE 636 W HCPCS: Performed by: INTERNAL MEDICINE

## 2023-03-10 PROCEDURE — 21400001 HC TELEMETRY ROOM

## 2023-03-10 PROCEDURE — 85025 COMPLETE CBC W/AUTO DIFF WBC: CPT | Performed by: INTERNAL MEDICINE

## 2023-03-10 PROCEDURE — 25000003 PHARM REV CODE 250: Performed by: INTERNAL MEDICINE

## 2023-03-10 PROCEDURE — 25000003 PHARM REV CODE 250: Performed by: EMERGENCY MEDICINE

## 2023-03-10 PROCEDURE — 97116 GAIT TRAINING THERAPY: CPT | Mod: CQ

## 2023-03-10 PROCEDURE — A4216 STERILE WATER/SALINE, 10 ML: HCPCS | Performed by: INTERNAL MEDICINE

## 2023-03-10 PROCEDURE — 27000207 HC ISOLATION

## 2023-03-10 PROCEDURE — 36410 VNPNXR 3YR/> PHY/QHP DX/THER: CPT

## 2023-03-10 PROCEDURE — 97530 THERAPEUTIC ACTIVITIES: CPT | Mod: CQ

## 2023-03-10 PROCEDURE — 80053 COMPREHEN METABOLIC PANEL: CPT | Performed by: INTERNAL MEDICINE

## 2023-03-10 PROCEDURE — 25000242 PHARM REV CODE 250 ALT 637 W/ HCPCS: Performed by: INTERNAL MEDICINE

## 2023-03-10 RX ORDER — SODIUM CHLORIDE 0.9 % (FLUSH) 0.9 %
10 SYRINGE (ML) INJECTION EVERY 6 HOURS
Status: DISCONTINUED | OUTPATIENT
Start: 2023-03-10 | End: 2023-03-15 | Stop reason: HOSPADM

## 2023-03-10 RX ORDER — FUROSEMIDE 10 MG/ML
40 INJECTION INTRAMUSCULAR; INTRAVENOUS EVERY 8 HOURS
Status: DISCONTINUED | OUTPATIENT
Start: 2023-03-10 | End: 2023-03-10

## 2023-03-10 RX ORDER — SODIUM CHLORIDE 0.9 % (FLUSH) 0.9 %
10 SYRINGE (ML) INJECTION
Status: DISCONTINUED | OUTPATIENT
Start: 2023-03-10 | End: 2023-03-15 | Stop reason: HOSPADM

## 2023-03-10 RX ORDER — FUROSEMIDE 10 MG/ML
40 INJECTION INTRAMUSCULAR; INTRAVENOUS
Status: DISCONTINUED | OUTPATIENT
Start: 2023-03-10 | End: 2023-03-12

## 2023-03-10 RX ORDER — TOLVAPTAN 15 MG/1
15 TABLET ORAL ONCE
Status: COMPLETED | OUTPATIENT
Start: 2023-03-10 | End: 2023-03-10

## 2023-03-10 RX ADMIN — BENZONATATE 100 MG: 100 CAPSULE ORAL at 08:03

## 2023-03-10 RX ADMIN — RIVAROXABAN 20 MG: 10 TABLET, FILM COATED ORAL at 04:03

## 2023-03-10 RX ADMIN — GUAIFENESIN 600 MG: 600 TABLET, EXTENDED RELEASE ORAL at 09:03

## 2023-03-10 RX ADMIN — Medication 10 ML: at 08:03

## 2023-03-10 RX ADMIN — HYDROXYZINE PAMOATE 25 MG: 25 CAPSULE ORAL at 08:03

## 2023-03-10 RX ADMIN — DEXAMETHASONE 6 MG: 2 TABLET ORAL at 09:03

## 2023-03-10 RX ADMIN — METOPROLOL TARTRATE 12.5 MG: 25 TABLET, FILM COATED ORAL at 08:03

## 2023-03-10 RX ADMIN — LOSARTAN POTASSIUM 25 MG: 25 TABLET, FILM COATED ORAL at 09:03

## 2023-03-10 RX ADMIN — GUAIFENESIN SYRUP AND DEXTROMETHORPHAN 10 ML: 100; 10 SYRUP ORAL at 02:03

## 2023-03-10 RX ADMIN — REMDESIVIR 100 MG: 100 INJECTION, POWDER, LYOPHILIZED, FOR SOLUTION INTRAVENOUS at 12:03

## 2023-03-10 RX ADMIN — Medication 6 MG: at 08:03

## 2023-03-10 RX ADMIN — FUROSEMIDE 40 MG: 10 INJECTION, SOLUTION INTRAMUSCULAR; INTRAVENOUS at 04:03

## 2023-03-10 RX ADMIN — ASPIRIN 81 MG: 81 TABLET, COATED ORAL at 09:03

## 2023-03-10 RX ADMIN — TOLVAPTAN 15 MG: 15 TABLET ORAL at 12:03

## 2023-03-10 RX ADMIN — METOPROLOL TARTRATE 12.5 MG: 25 TABLET, FILM COATED ORAL at 09:03

## 2023-03-10 RX ADMIN — GUAIFENESIN SYRUP AND DEXTROMETHORPHAN 10 ML: 100; 10 SYRUP ORAL at 08:03

## 2023-03-10 RX ADMIN — GUAIFENESIN 600 MG: 600 TABLET, EXTENDED RELEASE ORAL at 08:03

## 2023-03-10 NOTE — PLAN OF CARE
Spoke to Chuyita Helm she stated Auth. Was'nt approved just yet will give me a cb when she rec've Auth

## 2023-03-10 NOTE — PROCEDURES
"Ana Lake is a 89 y.o. female patient.    Temp: 97.4 °F (36.3 °C) (03/10/23 1123)  Pulse: 73 (03/10/23 1123)  Resp: 18 (03/10/23 0423)  BP: 118/76 (03/10/23 1123)  SpO2: 96 % (03/10/23 0423)  Weight: 77.1 kg (169 lb 15.6 oz) (03/08/23 1014)  Height: 4' 11" (149.9 cm) (03/07/23 0127)    PICC  Date/Time: 3/10/2023 12:25 PM  Performed by: May Farr RN  Consent Done: Yes  Time out: Immediately prior to procedure a time out was called to verify the correct patient, procedure, equipment, support staff and site/side marked as required  Indications: med administration and vascular access  Anesthesia: local infiltration  Local anesthetic: lidocaine 1% without epinephrine  Anesthetic Total (mL): 4  Preparation: skin prepped with ChloraPrep  Skin prep agent dried: skin prep agent completely dried prior to procedure  Sterile barriers: all five maximum sterile barriers used - cap, mask, sterile gown, sterile gloves, and large sterile sheet  Hand hygiene: hand hygiene performed prior to central venous catheter insertion  Location details: left basilic  Catheter type: single lumen  Catheter size: 4 Fr  Catheter Length: 18cm    Ultrasound guidance: yes  Vessel Caliber: medium and patent, compressibility normal  Needle advanced into vessel with real time Ultrasound guidance.  Guidewire confirmed in vessel.  Sterile sheath used.  Number of attempts: 1  Post-procedure: blood return through all ports, sterile dressing applied and chlorhexidine patch    Complications: none  Comments: Arm circ- 39cm      May Farr RN  3/10/2023    "

## 2023-03-10 NOTE — PT/OT/SLP PROGRESS
Attempting to see pt. This afternoon however pt. Declining at this time, fatigued from prior therapy session, just returning to bed. Follow up as schedule permits.

## 2023-03-10 NOTE — PROGRESS NOTES
[facility] Hospital Medicine Progress Note        Chief Complaint: Inpatient Follow-up    HPI:   89-year-old female with significant history of CHF with unknown ejection fraction, PAF on Xarelto, HTN.  Patient is a nursing home resident.  Patient presented to the ED with complaints of severe cough, shortness of breath.  Patient was recently seen in Cardiology Clinic multiple times, received IV Lasix all visits.  But despite IV Lasix she is been hypoxemic and required oxygen at nursing home.  Due to persistent symptoms she was brought to the ED.  Patient was afebrile, hemodynamically stable with saturation in mid 90s on 3 L oxygen supplementation.  Chest x-ray consistent with vascular congestion.  COVID-19 PCR was positive.  Hospitalist team was consulted for admission.  Patient was initiated on IV Lasix, treatment protocol for COVID-19 and was admitted to hospitalist medicine service.  Cardiology was consulted.  EF 50% paid patient has valvular heart disease-severe TR and moderate MR.  Cardiology recommended to continue diuresis.  Patient has history of sick sinus syndrome for which she is status pacemaker placement.  Pacemaker interrogated, recommended to continue current settings.  Respiratory status slowly improving on diuresis and appropriate treatment for COVID-19 infection.  Treatment protocol for COVID 19 continued.  Was off Lasix on 03/09, concern for volume overload and therefore restarted.  Also with hypervolemic hyponatremia.    Interval Hx:   Patient seen at bedside.  .  She is comfortably laying in bed.  Saturations are in mid 90s on nasal cannula.  Does have bilateral lower extremity edema.  She is a hard stick and bleeds easily.    Objective/physical exam:  General: In no acute distress, afebrile  Chest: Clear to auscultation bilaterally  Heart: S1, S2, no appreciable murmur  Abdomen: Soft, nontender, BS +  MSK:  Bilateral lower extremity edema   Neurologic:  Alert,  awake and seems oriented  VITAL SIGNS: 24 HRS MIN & MAX LAST   Temp  Min: 97.4 °F (36.3 °C)  Max: 97.9 °F (36.6 °C) 97.4 °F (36.3 °C)   BP  Min: 120/72  Max: 175/95 120/72     Pulse  Min: 63  Max: 80  63   Resp  Min: 18  Max: 94 18     SpO2  Min: 96 %  Max: 97 % 96 %       Recent Labs   Lab 03/09/23 0417   WBC 8.1   RBC 3.85*   HGB 13.4   HCT 39.6   .9*   MCH 34.8   MCHC 33.8   RDW 14.7   *   MPV 11.4*         Recent Labs   Lab 03/07/23  0635 03/08/23 0411 03/09/23 0417   * 130* 129*   K 3.6 4.2 4.3   CO2 31 28 30   BUN 29.3* 33.9* 34.0*   CREATININE 1.02 1.07* 0.91   CALCIUM 8.6 8.9 8.9   MG 1.80  --   --    ALBUMIN  --  2.9*  --    ALKPHOS  --  102  --    ALT  --  21  --    AST  --  46*  --    BILITOT  --  2.5*  --           Microbiology Results (last 7 days)       ** No results found for the last 168 hours. **             Scheduled Med:   aspirin  81 mg Oral QAM    dexAMETHasone  6 mg Oral Daily    furosemide (LASIX) injection  40 mg Intravenous Q8H    guaiFENesin  600 mg Oral BID    losartan  25 mg Oral Daily    metoprolol tartrate  12.5 mg Oral BID    remdesivir infusion  100 mg Intravenous Daily    rivaroxaban  20 mg Oral with dinner          Assessment/Plan:    Acute decompensated heart failure with preserved ejection fraction/right heart failure  COVID-19 infection   Acute on chronic hypoxemic respiratory failure secondary to both above  Acute on chronic hypervolemic hyponatremia  Valvular heart disease-severe TR/moderate MR  PAF on Xarelto   History of sick sinus syndrome status post pacemaker placement  Essential HTN-stable   Pulmonary hypertension   Chronic venous insufficiency  Prophylaxis    Chest x-ray repeated 3/9-small bilateral pleural effusion  Also with bilateral lower extremity edema   IV Lasix increased to 40 mg b.i.d.  Patient's respiratory status much better   Saturation mostly in mid 90s  Appreciate Cardiology recommendations   Will need outpatient follow-up with  Cardiology given valvular heart disease/possible right heart failure/pulmonary hypertension   Pacemaker interrogated this hospitalization-continue current settings  Chlorthalidone discontinued since she is on Lasix  Continue ARB, beta-blocker   Continue aspirin   Hyper volemic hyponatremia noted today, in fact worse compared to yesterday-126   I have ordered a dose of tolvaptan 15 mg, continue close monitoring  Patient completed remdesivir  Continue Decadron  Continue isolation precautions for COVID-19 infection per CDC guidelines  D-dimer was elevated, suspicion for PE less likely since the patient is already on Xarelto   DVT ruled out  DVT prophylaxis-on Xarelto    Labs reviewed personally   Worsening hyponatremia addressed appropriately as above  Plan to DC back to nursing home once volume status stabilizes and sodium improved          Ml Monsalve MD   03/10/2023

## 2023-03-10 NOTE — PT/OT/SLP PROGRESS
Physical Therapy Treatment    Patient Name:  Ana Lake   MRN:  [id number]    Recommendations:     Discharge Recommendations: nursing facility, skilled  Discharge Equipment Recommendations: none  Barriers to discharge:  Acuity of Illness    Assessment:     Ana Lake is a 89 y.o. female admitted with a medical diagnosis of Acute on chronic congestive heart failure.  She presents with the following impairments/functional limitations: weakness, impaired endurance .    Rehab Prognosis: Good; patient would benefit from acute skilled PT services to address these deficits and reach maximum level of function.    Recent Surgery: * No surgery found *      Plan:     During this hospitalization, patient to be seen 6 x/week to address the identified rehab impairments via gait training, therapeutic activities, therapeutic exercises and progress toward the following goals:    Plan of Care Expires:  04/08/23    Subjective     Chief Complaint: None  Patient/Family Comments/goals: None  Pain/Comfort:  0/10      Objective:     Pt found in bed upon entry.      General Precautions: Standard, droplet, contact, airborne, fall (COVID-19)  Orthopedic Precautions: N/A  Braces: N/A  Respiratory Status: Nasal cannula, flow 4 L/min     Functional Mobility:  Bed Mobility:     Supine to Sit: minimum assistance  Transitional Sit-to-stand: Min A with RW  Pt amb to bathroom commode for + BM.   Static Standing: Min A with RW  Pt stood edge of commode while being cleaned due to + BM.   Transfers: Min A with RW  Pt amb 12ft to bathroom commode for + BM. Pt then amb 12ft back to bed.   Pt performed stand step t/f from bedside chair to bed.     Patient left up in chair with call button in reach..    GOALS:   Multidisciplinary Problems       Physical Therapy Goals          Problem: Physical Therapy    Goal Priority Disciplines Outcome Goal Variances Interventions   Physical Therapy Goal    High PT, PT/OT Ongoing, Progressing     Description:  Goals to be met by: 2023     Patient will increase functional independence with mobility by performin. Sit to stand transfer with Modified [place]  2. Gait  x 200 feet with Modified [place] using LRAD.  3. Supine<>sit with Modified [place]                         Time Tracking:     PT Received On: 03/10/23  PT Start Time: 1008     PT Stop Time: 1034  PT Total Time (min): 26 min     Billable Minutes: Gait Training 10 and Therapeutic Activity 16    Treatment Type: Treatment  PT/PTA: PTA     PTA Visit Number: 2     03/10/2023

## 2023-03-11 LAB
ALBUMIN SERPL-MCNC: 2.7 G/DL (ref 3.4–4.8)
ALBUMIN/GLOB SERPL: 0.8 RATIO (ref 1.1–2)
ALP SERPL-CCNC: 99 UNIT/L (ref 40–150)
ALT SERPL-CCNC: 21 UNIT/L (ref 0–55)
AST SERPL-CCNC: 46 UNIT/L (ref 5–34)
BASOPHILS # BLD AUTO: 0.01 X10(3)/MCL (ref 0–0.2)
BASOPHILS NFR BLD AUTO: 0.1 %
BILIRUBIN DIRECT+TOT PNL SERPL-MCNC: 2.3 MG/DL
BUN SERPL-MCNC: 45.4 MG/DL (ref 9.8–20.1)
CALCIUM SERPL-MCNC: 9.3 MG/DL (ref 8.4–10.2)
CHLORIDE SERPL-SCNC: 91 MMOL/L (ref 98–107)
CO2 SERPL-SCNC: 28 MMOL/L (ref 23–31)
CREAT SERPL-MCNC: 0.95 MG/DL (ref 0.55–1.02)
EOSINOPHIL # BLD AUTO: 0 X10(3)/MCL (ref 0–0.9)
EOSINOPHIL NFR BLD AUTO: 0 %
ERYTHROCYTE [DISTWIDTH] IN BLOOD BY AUTOMATED COUNT: 14.6 % (ref 11.5–17)
GFR SERPLBLD CREATININE-BSD FMLA CKD-EPI: 57 MLS/MIN/1.73/M2
GLOBULIN SER-MCNC: 3.6 GM/DL (ref 2.4–3.5)
GLUCOSE SERPL-MCNC: 118 MG/DL (ref 82–115)
HCT VFR BLD AUTO: 40.8 % (ref 37–47)
HGB BLD-MCNC: 13.9 G/DL (ref 12–16)
IMM GRANULOCYTES # BLD AUTO: 0.03 X10(3)/MCL (ref 0–0.04)
IMM GRANULOCYTES NFR BLD AUTO: 0.4 %
LYMPHOCYTES # BLD AUTO: 0.65 X10(3)/MCL (ref 0.6–4.6)
LYMPHOCYTES NFR BLD AUTO: 7.7 %
MCH RBC QN AUTO: 34.6 PG
MCHC RBC AUTO-ENTMCNC: 34.1 G/DL (ref 33–36)
MCV RBC AUTO: 101.5 FL (ref 80–94)
MONOCYTES # BLD AUTO: 0.67 X10(3)/MCL (ref 0.1–1.3)
MONOCYTES NFR BLD AUTO: 7.9 %
NEUTROPHILS # BLD AUTO: 7.07 X10(3)/MCL (ref 2.1–9.2)
NEUTROPHILS NFR BLD AUTO: 83.9 %
NRBC BLD AUTO-RTO: 0 %
PLATELET # BLD AUTO: 116 X10(3)/MCL (ref 130–400)
PMV BLD AUTO: 11.4 FL (ref 7.4–10.4)
POTASSIUM SERPL-SCNC: 4.4 MMOL/L (ref 3.5–5.1)
PROT SERPL-MCNC: 6.3 GM/DL (ref 5.8–7.6)
RBC # BLD AUTO: 4.02 X10(6)/MCL (ref 4.2–5.4)
SODIUM SERPL-SCNC: 130 MMOL/L (ref 136–145)
WBC # SPEC AUTO: 8.4 X10(3)/MCL (ref 4.5–11.5)

## 2023-03-11 PROCEDURE — 80053 COMPREHEN METABOLIC PANEL: CPT | Performed by: INTERNAL MEDICINE

## 2023-03-11 PROCEDURE — 97530 THERAPEUTIC ACTIVITIES: CPT | Mod: CO

## 2023-03-11 PROCEDURE — 25000003 PHARM REV CODE 250: Performed by: NURSE PRACTITIONER

## 2023-03-11 PROCEDURE — 25000003 PHARM REV CODE 250: Performed by: INTERNAL MEDICINE

## 2023-03-11 PROCEDURE — 27000207 HC ISOLATION

## 2023-03-11 PROCEDURE — 63600175 PHARM REV CODE 636 W HCPCS: Performed by: INTERNAL MEDICINE

## 2023-03-11 PROCEDURE — 85025 COMPLETE CBC W/AUTO DIFF WBC: CPT | Performed by: INTERNAL MEDICINE

## 2023-03-11 PROCEDURE — 25000003 PHARM REV CODE 250: Performed by: EMERGENCY MEDICINE

## 2023-03-11 PROCEDURE — 21400001 HC TELEMETRY ROOM

## 2023-03-11 PROCEDURE — A4216 STERILE WATER/SALINE, 10 ML: HCPCS | Performed by: INTERNAL MEDICINE

## 2023-03-11 PROCEDURE — 25000242 PHARM REV CODE 250 ALT 637 W/ HCPCS: Performed by: INTERNAL MEDICINE

## 2023-03-11 RX ORDER — TOLVAPTAN 15 MG/1
15 TABLET ORAL ONCE
Status: COMPLETED | OUTPATIENT
Start: 2023-03-11 | End: 2023-03-11

## 2023-03-11 RX ADMIN — Medication 10 ML: at 05:03

## 2023-03-11 RX ADMIN — Medication 6 MG: at 09:03

## 2023-03-11 RX ADMIN — DEXAMETHASONE 6 MG: 2 TABLET ORAL at 09:03

## 2023-03-11 RX ADMIN — GUAIFENESIN 600 MG: 600 TABLET, EXTENDED RELEASE ORAL at 09:03

## 2023-03-11 RX ADMIN — LOSARTAN POTASSIUM 25 MG: 25 TABLET, FILM COATED ORAL at 09:03

## 2023-03-11 RX ADMIN — GUAIFENESIN SYRUP AND DEXTROMETHORPHAN 10 ML: 100; 10 SYRUP ORAL at 05:03

## 2023-03-11 RX ADMIN — BENZONATATE 100 MG: 100 CAPSULE ORAL at 03:03

## 2023-03-11 RX ADMIN — BENZONATATE 100 MG: 100 CAPSULE ORAL at 05:03

## 2023-03-11 RX ADMIN — ASPIRIN 81 MG: 81 TABLET, COATED ORAL at 09:03

## 2023-03-11 RX ADMIN — METOPROLOL TARTRATE 12.5 MG: 25 TABLET, FILM COATED ORAL at 09:03

## 2023-03-11 RX ADMIN — Medication 10 ML: at 12:03

## 2023-03-11 RX ADMIN — TOLVAPTAN 15 MG: 15 TABLET ORAL at 09:03

## 2023-03-11 RX ADMIN — GUAIFENESIN SYRUP AND DEXTROMETHORPHAN 10 ML: 100; 10 SYRUP ORAL at 12:03

## 2023-03-11 RX ADMIN — GUAIFENESIN SYRUP AND DEXTROMETHORPHAN 10 ML: 100; 10 SYRUP ORAL at 09:03

## 2023-03-11 RX ADMIN — HYDROXYZINE PAMOATE 25 MG: 25 CAPSULE ORAL at 09:03

## 2023-03-11 RX ADMIN — Medication 10 ML: at 01:03

## 2023-03-11 RX ADMIN — FUROSEMIDE 40 MG: 10 INJECTION, SOLUTION INTRAMUSCULAR; INTRAVENOUS at 05:03

## 2023-03-11 RX ADMIN — DOCUSATE SODIUM 50 MG: 50 CAPSULE, LIQUID FILLED ORAL at 09:03

## 2023-03-11 RX ADMIN — RIVAROXABAN 20 MG: 10 TABLET, FILM COATED ORAL at 05:03

## 2023-03-11 RX ADMIN — BENZONATATE 100 MG: 100 CAPSULE ORAL at 09:03

## 2023-03-11 NOTE — PT/OT/SLP PROGRESS
"Occupational Therapy  Treatment    Ana Lake   MRN: [id number]   Admitting Diagnosis: Acute on chronic congestive heart failure        OT Start Time: 1015  OT Stop Time: 1035  OT Total Time (min): 20 min        OT/ALIREZA: ALIREZA     ALIREZA Visit Number: 2    General Precautions: Standard, airborne, contact  Orthopedic Precautions:    Braces:           Subjective:  Communicated with RN prior to session.    Pain/Comfort  Pain Rating 1: 0/10    S: "I am so tired, they just cleaned me up, but I will try."    O: pt sup>EOB c min A for allowing Pt to pull against resistance to bring trunk upright. Pt req extended time 2/2 pt visibly fatigued. Pt EOB completed lateral scoot to HOB c CGA. Pt returned to sup c CGA and scooted to middle of bed c Vcs for bridging. Pt left wit all needs in reach.    A: pt pleasant and motivated but very fatigued this date.    P: pt would benefit from continued skilled OT tx for increased I and safety in self care and adl tasks.     AM-PAC 6 CLICK ADL   How much help from another person does this patient currently need?   1 = Unable, Total/Dependent Assistance  2 = A lot, Maximum/Moderate Assistance  3 = A little, Minimum/Contact Guard/Supervision  4 = None, Modified [place]/Independent    Putting on and taking off regular lower body clothing? : 2  Bathing (including washing, rinsing, drying)?: 2  Toileting, which includes using toilet, bedpan, or urinal? : 2  Putting on and taking off regular upper body clothing?: 3  Taking care of personal grooming such as brushing teeth?: 4  Eating meals?: 4  Daily Activity Total Score: 17     AM-PAC Raw Score CMS "G-Code Modifier Level of Impairment Assistance   6 % Total / Unable   7 - 8 CM 80 - 100% Maximal Assist   9-13 CL 60 - 80% Moderate Assist   14 - 19 CK 40 - 60% Moderate Assist   20 - 22 CJ 20 - 40% Minimal Assist   23 CI 1-20% SBA / CGA   24 CH 0% Independent/ Mod I       Patient left supine with all lines intact and call button in " reach    ASSESSMENT:  Ana Lake is a 89 y.o. female with a medical diagnosis of Acute on chronic congestive heart failure and presents with .    Rehab identified problem list/impairments:  weakness, impaired endurance, impaired self care skills, impaired functional mobility, impaired balance, gait instability, decreased upper extremity function    Rehab potential is good.    Activity tolerance: Fair    Discharge recommendations: nursing facility, skilled   Barriers to discharge:      Equipment recommendations:      GOALS:   Multidisciplinary Problems       Occupational Therapy Goals          Problem: Occupational Therapy    Goal Priority Disciplines Outcome Interventions   Occupational Therapy Goal     OT, PT/OT Ongoing, Progressing    Description: Goals to be met by:  3/22/2023    Patient will increase functional independence with ADLs by performing:    UE Dressing with Modified [place].  LE Dressing with Modified [place].  Grooming while standing with Modified [place].  Toileting from bedside commode with Modified [place] for hygiene and clothing management.   Toilet transfer to bedside commode with Modified [place].                         Plan:  Patient to be seen 3 x/week, 5 x/week to address the above listed problems via self-care/home management, therapeutic activities, therapeutic exercises  Plan of Care expires:    Plan of Care reviewed with: patient         03/11/2023

## 2023-03-11 NOTE — PROGRESS NOTES
[facility]  Hospital Medicine Progress Note        Chief Complaint: Inpatient Follow-up    HPI:   89-year-old female with significant history of CHF with unknown ejection fraction, PAF on Xarelto, HTN.  Patient is a nursing home resident.  Patient presented to the ED with complaints of severe cough, shortness of breath.  Patient was recently seen in Cardiology Clinic multiple times, received IV Lasix all visits.  But despite IV Lasix she is been hypoxemic and required oxygen at nursing home.  Due to persistent symptoms she was brought to the ED.  Patient was afebrile, hemodynamically stable with saturation in mid 90s on 3 L oxygen supplementation.  Chest x-ray consistent with vascular congestion.  COVID-19 PCR was positive.  Hospitalist team was consulted for admission.  Patient was initiated on IV Lasix, treatment protocol for COVID-19 and was admitted to hospitalist medicine service.  Cardiology was consulted.  EF 50% paid patient has valvular heart disease-severe TR and moderate MR.  Cardiology recommended to continue diuresis.  Patient has history of sick sinus syndrome for which she is status pacemaker placement.  Pacemaker interrogated, recommended to continue current settings.  Respiratory status slowly improving on diuresis and appropriate treatment for COVID-19 infection.  Treatment protocol for COVID 19 continued.  Was off Lasix on 03/09, concern for volume overload and therefore restarted.  Also with hypervolemic hyponatremia.  Still with significant volume overload, hypervolemic hyponatremia on 03/10.  IV Lasix increased to 40 mg b.i.d. and added 1 dose of tolvaptan.    Interval Hx:   Patient seen at bedside.  .  Lower extremity swelling persist, but better compared to yesterday.  Left arm is also swollen. patient now has a midline . no other new complaints.  Hemodynamics stable and respiratory status stable on nasal cannula.  Saturation in mid 90s on 3  L.    Objective/physical exam:  General: In no acute distress, afebrile  Chest: Clear to auscultation bilaterally  Heart: S1, S2, no appreciable murmur  Abdomen: Soft, nontender, BS +  MSK:  Bilateral lower extremity edema   Neurologic:  Alert, awake and seems oriented  VITAL SIGNS: 24 HRS MIN & MAX LAST   Temp  Min: 97.3 °F (36.3 °C)  Max: 97.6 °F (36.4 °C) 97.6 °F (36.4 °C)   BP  Min: 106/67  Max: 128/75 (!) 119/55     Pulse  Min: 60  Max: 73  65   Resp  Min: 18  Max: 19 19     SpO2  Min: 95 %  Max: 98 % 95 %       Recent Labs   Lab 03/11/23  0431   WBC 8.4   RBC 4.02*   HGB 13.9   HCT 40.8   .5*   MCH 34.6   MCHC 34.1   RDW 14.6   *   MPV 11.4*         Recent Labs   Lab 03/07/23  0635 03/08/23  0411 03/11/23  0431   *   < > 130*   K 3.6   < > 4.4   CO2 31   < > 28   BUN 29.3*   < > 45.4*   CREATININE 1.02   < > 0.95   CALCIUM 8.6   < > 9.3   MG 1.80  --   --    ALBUMIN  --    < > 2.7*   ALKPHOS  --    < > 99   ALT  --    < > 21   AST  --    < > 46*   BILITOT  --    < > 2.3*    < > = values in this interval not displayed.          Microbiology Results (last 7 days)       ** No results found for the last 168 hours. **             Scheduled Med:   aspirin  81 mg Oral QAM    dexAMETHasone  6 mg Oral Daily    furosemide (LASIX) injection  40 mg Intravenous Q12H    guaiFENesin  600 mg Oral BID    losartan  25 mg Oral Daily    metoprolol tartrate  12.5 mg Oral BID    rivaroxaban  20 mg Oral with dinner    sodium chloride 0.9%  10 mL Intravenous Q6H    tolvaptan  15 mg Oral Once          Assessment/Plan:    Acute decompensated heart failure with preserved ejection fraction/right heart failure  COVID-19 infection   Acute on chronic hypoxemic respiratory failure secondary to both above  Acute on chronic hypervolemic hyponatremia  Valvular heart disease-severe TR/moderate MR  PAF on Xarelto   History of sick sinus syndrome status post pacemaker placement  Essential HTN-stable   Pulmonary hypertension    Chronic venous insufficiency  Prophylaxis    Chest x-ray repeated 3/9-small bilateral pleural effusion  Also with bilateral lower extremity edema   IV Lasix increased to 40 mg b.i.d on 03/10  Patient's respiratory status much better   Saturation mostly in mid 90s on 3 L  Appreciate Cardiology recommendations   Will need outpatient follow-up with Cardiology given valvular heart disease/possible right heart failure/pulmonary hypertension   Pacemaker interrogated this hospitalization-continue current settings  Chlorthalidone discontinued since she is on Lasix  Continue ARB, beta-blocker   Continue aspirin   Hypervolemic hyponatremia treated with 1 dose of tolvaptan 15 mg on 03/10, I will repeat the same dose again today  Sodium is improving-130 today  Patient completed remdesivir  Continue Decadron  Continue isolation precautions for COVID-19 infection per CDC guidelines  D-dimer was elevated, suspicion for PE less likely since the patient is already on Xarelto   DVT ruled out  Ordered left upper extremity venous ultrasound given swelling  DVT prophylaxis-on Xarelto    Labs reviewed personally   Hyponatremia improved to 130 on tolvaptan  Plan to DC back to nursing home once volume status stabilizes         Ml Monsalve MD   03/11/2023

## 2023-03-12 LAB
ALBUMIN SERPL-MCNC: 2.5 G/DL (ref 3.4–4.8)
ALBUMIN/GLOB SERPL: 0.8 RATIO (ref 1.1–2)
ALP SERPL-CCNC: 89 UNIT/L (ref 40–150)
ALT SERPL-CCNC: 19 UNIT/L (ref 0–55)
AST SERPL-CCNC: 39 UNIT/L (ref 5–34)
BASOPHILS # BLD AUTO: 0.01 X10(3)/MCL (ref 0–0.2)
BASOPHILS NFR BLD AUTO: 0.1 %
BILIRUBIN DIRECT+TOT PNL SERPL-MCNC: 2.3 MG/DL
BUN SERPL-MCNC: 47.1 MG/DL (ref 9.8–20.1)
CALCIUM SERPL-MCNC: 8.7 MG/DL (ref 8.4–10.2)
CHLORIDE SERPL-SCNC: 94 MMOL/L (ref 98–107)
CO2 SERPL-SCNC: 33 MMOL/L (ref 23–31)
CREAT SERPL-MCNC: 0.96 MG/DL (ref 0.55–1.02)
EOSINOPHIL # BLD AUTO: 0 X10(3)/MCL (ref 0–0.9)
EOSINOPHIL NFR BLD AUTO: 0 %
ERYTHROCYTE [DISTWIDTH] IN BLOOD BY AUTOMATED COUNT: 14.7 % (ref 11.5–17)
GFR SERPLBLD CREATININE-BSD FMLA CKD-EPI: 57 MLS/MIN/1.73/M2
GLOBULIN SER-MCNC: 3.3 GM/DL (ref 2.4–3.5)
GLUCOSE SERPL-MCNC: 132 MG/DL (ref 82–115)
HCT VFR BLD AUTO: 39.5 % (ref 37–47)
HGB BLD-MCNC: 13.1 G/DL (ref 12–16)
IMM GRANULOCYTES # BLD AUTO: 0.05 X10(3)/MCL (ref 0–0.04)
IMM GRANULOCYTES NFR BLD AUTO: 0.6 %
LYMPHOCYTES # BLD AUTO: 0.66 X10(3)/MCL (ref 0.6–4.6)
LYMPHOCYTES NFR BLD AUTO: 7.8 %
MCH RBC QN AUTO: 34.3 PG
MCHC RBC AUTO-ENTMCNC: 33.2 G/DL (ref 33–36)
MCV RBC AUTO: 103.4 FL (ref 80–94)
MONOCYTES # BLD AUTO: 0.72 X10(3)/MCL (ref 0.1–1.3)
MONOCYTES NFR BLD AUTO: 8.5 %
NEUTROPHILS # BLD AUTO: 7.06 X10(3)/MCL (ref 2.1–9.2)
NEUTROPHILS NFR BLD AUTO: 83 %
NRBC BLD AUTO-RTO: 0 %
PLATELET # BLD AUTO: 112 X10(3)/MCL (ref 130–400)
PMV BLD AUTO: 11.4 FL (ref 7.4–10.4)
POTASSIUM SERPL-SCNC: 4 MMOL/L (ref 3.5–5.1)
PROT SERPL-MCNC: 5.8 GM/DL (ref 5.8–7.6)
RBC # BLD AUTO: 3.82 X10(6)/MCL (ref 4.2–5.4)
SODIUM SERPL-SCNC: 135 MMOL/L (ref 136–145)
WBC # SPEC AUTO: 8.5 X10(3)/MCL (ref 4.5–11.5)

## 2023-03-12 PROCEDURE — 21400001 HC TELEMETRY ROOM

## 2023-03-12 PROCEDURE — 25000003 PHARM REV CODE 250: Performed by: INTERNAL MEDICINE

## 2023-03-12 PROCEDURE — 25000242 PHARM REV CODE 250 ALT 637 W/ HCPCS: Performed by: INTERNAL MEDICINE

## 2023-03-12 PROCEDURE — 97530 THERAPEUTIC ACTIVITIES: CPT | Mod: CQ

## 2023-03-12 PROCEDURE — 25000003 PHARM REV CODE 250: Performed by: EMERGENCY MEDICINE

## 2023-03-12 PROCEDURE — 63600175 PHARM REV CODE 636 W HCPCS: Performed by: INTERNAL MEDICINE

## 2023-03-12 PROCEDURE — A4216 STERILE WATER/SALINE, 10 ML: HCPCS | Performed by: INTERNAL MEDICINE

## 2023-03-12 PROCEDURE — 85025 COMPLETE CBC W/AUTO DIFF WBC: CPT | Performed by: INTERNAL MEDICINE

## 2023-03-12 PROCEDURE — 27000207 HC ISOLATION

## 2023-03-12 PROCEDURE — 25000003 PHARM REV CODE 250: Performed by: NURSE PRACTITIONER

## 2023-03-12 PROCEDURE — 80053 COMPREHEN METABOLIC PANEL: CPT | Performed by: INTERNAL MEDICINE

## 2023-03-12 RX ORDER — ALBUTEROL SULFATE 90 UG/1
2 AEROSOL, METERED RESPIRATORY (INHALATION) 4 TIMES DAILY
Status: DISCONTINUED | OUTPATIENT
Start: 2023-03-12 | End: 2023-03-15 | Stop reason: HOSPADM

## 2023-03-12 RX ORDER — FUROSEMIDE 10 MG/ML
40 INJECTION INTRAMUSCULAR; INTRAVENOUS DAILY
Status: DISCONTINUED | OUTPATIENT
Start: 2023-03-13 | End: 2023-03-15 | Stop reason: HOSPADM

## 2023-03-12 RX ADMIN — Medication 10 ML: at 11:03

## 2023-03-12 RX ADMIN — FUROSEMIDE 40 MG: 10 INJECTION, SOLUTION INTRAMUSCULAR; INTRAVENOUS at 05:03

## 2023-03-12 RX ADMIN — Medication 10 ML: at 12:03

## 2023-03-12 RX ADMIN — HYDROXYZINE PAMOATE 25 MG: 25 CAPSULE ORAL at 09:03

## 2023-03-12 RX ADMIN — ASPIRIN 81 MG: 81 TABLET, COATED ORAL at 09:03

## 2023-03-12 RX ADMIN — GUAIFENESIN 600 MG: 600 TABLET, EXTENDED RELEASE ORAL at 09:03

## 2023-03-12 RX ADMIN — ALBUTEROL SULFATE 2 PUFF: 90 AEROSOL, METERED RESPIRATORY (INHALATION) at 09:03

## 2023-03-12 RX ADMIN — Medication 10 ML: at 05:03

## 2023-03-12 RX ADMIN — GUAIFENESIN SYRUP AND DEXTROMETHORPHAN 10 ML: 100; 10 SYRUP ORAL at 05:03

## 2023-03-12 RX ADMIN — ALBUTEROL SULFATE 2 PUFF: 90 AEROSOL, METERED RESPIRATORY (INHALATION) at 05:03

## 2023-03-12 RX ADMIN — METOPROLOL TARTRATE 12.5 MG: 25 TABLET, FILM COATED ORAL at 09:03

## 2023-03-12 RX ADMIN — LOSARTAN POTASSIUM 25 MG: 25 TABLET, FILM COATED ORAL at 09:03

## 2023-03-12 RX ADMIN — ALBUTEROL SULFATE 2 PUFF: 90 AEROSOL, METERED RESPIRATORY (INHALATION) at 12:03

## 2023-03-12 RX ADMIN — GUAIFENESIN SYRUP AND DEXTROMETHORPHAN 10 ML: 100; 10 SYRUP ORAL at 09:03

## 2023-03-12 RX ADMIN — DEXAMETHASONE 6 MG: 2 TABLET ORAL at 09:03

## 2023-03-12 RX ADMIN — BENZONATATE 100 MG: 100 CAPSULE ORAL at 09:03

## 2023-03-12 RX ADMIN — RIVAROXABAN 20 MG: 10 TABLET, FILM COATED ORAL at 04:03

## 2023-03-12 RX ADMIN — GUAIFENESIN SYRUP AND DEXTROMETHORPHAN 10 ML: 100; 10 SYRUP ORAL at 12:03

## 2023-03-12 RX ADMIN — DOCUSATE SODIUM 50 MG: 50 CAPSULE, LIQUID FILLED ORAL at 05:03

## 2023-03-12 RX ADMIN — BENZONATATE 100 MG: 100 CAPSULE ORAL at 05:03

## 2023-03-12 NOTE — PT/OT/SLP PROGRESS
Physical Therapy Treatment    Patient Name:  Ana Lake   MRN:  [id number]    Recommendations:     Discharge Recommendations:  nursing facility, skilled   Discharge Equipment Recommendations: none     Subjective     Patient awake, alert, and cooperative.     Communicated with NSG prior to session to see if Pt is appropriate for therapy today. Pt greeted and agreed to session.    Objective:     General Precautions: Standard, droplet, contact, airborne, fall (COVID-19)   Orthopedic Precautions:N/A   Braces:    Respiratory Status: Nasal cannula, flow 2 L/min     Functional Mobility:    Bed Mobility: Mod Assist  Sit to Stand: Mod Assist  Transfers: Mod Assist    Equipment Used: Gait belt, Rolling walker    Assessment:     Ana Lake is a 89 y.o. female admitted with a medical diagnosis of Acute on chronic congestive heart failure.     Treatment Encounter Note:  Patient actively participated with treatment today with no adverse effects. Patient performed supine to sitting EOB with min-mod A, pt sat eob with min A she was slipping off the bed, pt was asst to standing with min-mod A, pt then transferred to the bs chair withmin-mod A and small steps, pt did completed B LE therex in sitting for 20 reps, she is hard of hearing.Patient left up in chair with call button in reach.    Rehab Prognosis: Fair; patient would benefit from acute skilled PT services to address these deficits and reach maximum level of function.    Recent Surgery: * No surgery found *    GOALS:   Multidisciplinary Problems       Physical Therapy Goals          Problem: Physical Therapy    Goal Priority Disciplines Outcome Goal Variances Interventions   Physical Therapy Goal    High PT, PT/OT Ongoing, Progressing     Description: Goals to be met by: 2023     Patient will increase functional independence with mobility by performin. Sit to stand transfer with Modified [place]  2. Gait  x 200 feet with Modified [place] using  LRAD.  3. Supine<>sit with Modified [place]                         Plan:     During this hospitalization, patient to be seen 6 x/week to address the identified rehab impairments via gait training, therapeutic activities, therapeutic exercises and progress toward the following goals:    Plan of Care Expires:  04/08/23    Billable Minutes     Billable Minutes: Therapeutic Activity 23       PT/PTA: PTA     PTA Visit Number: 5     03/12/2023

## 2023-03-12 NOTE — PROGRESS NOTES
[facility]  Hospital Medicine Progress Note        Chief Complaint: Inpatient Follow-up    HPI:   89-year-old female with significant history of CHF with unknown ejection fraction, PAF on Xarelto, HTN.  Patient is a nursing home resident.  Patient presented to the ED with complaints of severe cough, shortness of breath.  Patient was recently seen in Cardiology Clinic multiple times, received IV Lasix all visits.  But despite IV Lasix she is been hypoxemic and required oxygen at nursing home.  Due to persistent symptoms she was brought to the ED.  Patient was afebrile, hemodynamically stable with saturation in mid 90s on 3 L oxygen supplementation.  Chest x-ray consistent with vascular congestion.  COVID-19 PCR was positive.  Hospitalist team was consulted for admission.  Patient was initiated on IV Lasix, treatment protocol for COVID-19 and was admitted to hospitalist medicine service.  Cardiology was consulted.  EF 50% paid patient has valvular heart disease-severe TR and moderate MR.  Cardiology recommended to continue diuresis.  Patient has history of sick sinus syndrome for which she is status pacemaker placement.  Pacemaker interrogated, recommended to continue current settings.  Respiratory status slowly improving on diuresis and appropriate treatment for COVID-19 infection.  Treatment protocol for COVID 19 continued.  Was off Lasix on 03/09, concern for volume overload and therefore restarted.  Also with hypervolemic hyponatremia.  Still with significant volume overload, hypervolemic hyponatremia on 03/10.  IV Lasix increased to 40 mg b.i.d. and added 1 dose of tolvaptan.  IV Lasix continued and Tolvaptan dose repeated on 03/11      Interval Hx:   Patient seen at bedside.  She is weaned down to 2 L, respiratory status stable.  Lower extremity swelling persist even though there is some improvement.  Daughter is at bedside.  No acute events overnight.  Objective/physical  exam:  General: In no acute distress, afebrile  Chest: Clear to auscultation bilaterally  Heart: S1, S2, no appreciable murmur  Abdomen: Soft, nontender, BS +  MSK:  Bilateral lower extremity edema, left arm swollen   Neurologic:  Alert, awake and seems oriented  VITAL SIGNS: 24 HRS MIN & MAX LAST   Temp  Min: 97.2 °F (36.2 °C)  Max: 97.8 °F (36.6 °C) 97.6 °F (36.4 °C)   BP  Min: 104/65  Max: 124/70 123/73     Pulse  Min: 61  Max: 64  63   Resp  Min: 10  Max: 18 10     SpO2  Min: 93 %  Max: 98 % 95 %       Recent Labs   Lab 03/12/23  0616   WBC 8.5   RBC 3.82*   HGB 13.1   HCT 39.5   .4*   MCH 34.3   MCHC 33.2   RDW 14.7   *   MPV 11.4*         Recent Labs   Lab 03/07/23  0635 03/08/23  0411 03/12/23  0616   *   < > 135*   K 3.6   < > 4.0   CO2 31   < > 33*   BUN 29.3*   < > 47.1*   CREATININE 1.02   < > 0.96   CALCIUM 8.6   < > 8.7   MG 1.80  --   --    ALBUMIN  --    < > 2.5*   ALKPHOS  --    < > 89   ALT  --    < > 19   AST  --    < > 39*   BILITOT  --    < > 2.3*    < > = values in this interval not displayed.          Microbiology Results (last 7 days)       ** No results found for the last 168 hours. **             Scheduled Med:   aspirin  81 mg Oral QAM    dexAMETHasone  6 mg Oral Daily    [START ON 3/13/2023] furosemide (LASIX) injection  40 mg Intravenous Daily    guaiFENesin  600 mg Oral BID    losartan  25 mg Oral Daily    metoprolol tartrate  12.5 mg Oral BID    rivaroxaban  20 mg Oral with dinner    sodium chloride 0.9%  10 mL Intravenous Q6H          Assessment/Plan:    Acute decompensated heart failure with preserved ejection fraction/right heart failure  COVID-19 infection   Acute on chronic hypoxemic respiratory failure secondary to both above  Acute on chronic hypervolemic hyponatremia  Valvular heart disease-severe TR/moderate MR  PAF on Xarelto   History of sick sinus syndrome status post pacemaker placement  Essential HTN-stable   Pulmonary hypertension   Chronic venous  insufficiency  Prophylaxis    Patient is currently on IV Lasix 40 mg b.i.d.  Received 2 doses of tolvaptan on 03/10 and 3/11   Sodium improved to 135   Volume status slowly improving   Respiratory status much better, weaned down to 2 L   BUN rising   I will back off on Lasix to 40 mg daily   Continue to monitor strict Is&Os  Plan to repeat chest x-ray tomorrow morning  Appreciate Cardiology recommendations   Will need outpatient follow-up with Cardiology given valvular heart disease/possible right heart failure/pulmonary hypertension   Pacemaker interrogated this hospitalization-continue current settings  Chlorthalidone discontinued since she is on Lasix  Continue ARB, beta-blocker   Continue aspirin   Patient completed remdesivir  Continue Decadron  Continue isolation precautions for COVID-19 infection per CDC guidelines  D-dimer was elevated, suspicion for PE less likely since the patient is already on Xarelto   DVT ruled out  Ordered left upper extremity venous ultrasound given swelling  DVT prophylaxis-on Xarelto    Labs reviewed personally   Hyponatremia improved to 135 on tolvaptan    Treatment plan of care discussed in detail with daughter at bedside explained poor prognosis given her age and multiple comorbidities   Discussed code status   Family would like to think about it   Patient is from skilled nursing facility at [facility]   Family would like her to go back to another nursing home as skilled-[facility]   Will discuss with case management tomorrow      Ml Monsalve MD   03/12/2023

## 2023-03-13 LAB
ALBUMIN SERPL-MCNC: 2.6 G/DL (ref 3.4–4.8)
ALBUMIN/GLOB SERPL: 0.8 RATIO (ref 1.1–2)
ALP SERPL-CCNC: 96 UNIT/L (ref 40–150)
ALT SERPL-CCNC: 21 UNIT/L (ref 0–55)
AST SERPL-CCNC: 40 UNIT/L (ref 5–34)
BASOPHILS # BLD AUTO: 0.01 X10(3)/MCL (ref 0–0.2)
BASOPHILS NFR BLD AUTO: 0.1 %
BILIRUBIN DIRECT+TOT PNL SERPL-MCNC: 2.5 MG/DL
BUN SERPL-MCNC: 51 MG/DL (ref 9.8–20.1)
CALCIUM SERPL-MCNC: 8.7 MG/DL (ref 8.4–10.2)
CHLORIDE SERPL-SCNC: 92 MMOL/L (ref 98–107)
CO2 SERPL-SCNC: 31 MMOL/L (ref 23–31)
CREAT SERPL-MCNC: 0.99 MG/DL (ref 0.55–1.02)
EOSINOPHIL # BLD AUTO: 0 X10(3)/MCL (ref 0–0.9)
EOSINOPHIL NFR BLD AUTO: 0 %
ERYTHROCYTE [DISTWIDTH] IN BLOOD BY AUTOMATED COUNT: 14.6 % (ref 11.5–17)
GFR SERPLBLD CREATININE-BSD FMLA CKD-EPI: 55 MLS/MIN/1.73/M2
GLOBULIN SER-MCNC: 3.3 GM/DL (ref 2.4–3.5)
GLUCOSE SERPL-MCNC: 133 MG/DL (ref 82–115)
HCT VFR BLD AUTO: 40.5 % (ref 37–47)
HGB BLD-MCNC: 13.6 G/DL (ref 12–16)
IMM GRANULOCYTES # BLD AUTO: 0.05 X10(3)/MCL (ref 0–0.04)
IMM GRANULOCYTES NFR BLD AUTO: 0.5 %
LYMPHOCYTES # BLD AUTO: 0.78 X10(3)/MCL (ref 0.6–4.6)
LYMPHOCYTES NFR BLD AUTO: 7.5 %
MCH RBC QN AUTO: 34.3 PG
MCHC RBC AUTO-ENTMCNC: 33.6 G/DL (ref 33–36)
MCV RBC AUTO: 102 FL (ref 80–94)
MONOCYTES # BLD AUTO: 0.77 X10(3)/MCL (ref 0.1–1.3)
MONOCYTES NFR BLD AUTO: 7.4 %
NEUTROPHILS # BLD AUTO: 8.73 X10(3)/MCL (ref 2.1–9.2)
NEUTROPHILS NFR BLD AUTO: 84.5 %
NRBC BLD AUTO-RTO: 0 %
PLATELET # BLD AUTO: 100 X10(3)/MCL (ref 130–400)
PMV BLD AUTO: 11.6 FL (ref 7.4–10.4)
POTASSIUM SERPL-SCNC: 4 MMOL/L (ref 3.5–5.1)
PROT SERPL-MCNC: 5.9 GM/DL (ref 5.8–7.6)
RBC # BLD AUTO: 3.97 X10(6)/MCL (ref 4.2–5.4)
SODIUM SERPL-SCNC: 132 MMOL/L (ref 136–145)
WBC # SPEC AUTO: 10.3 X10(3)/MCL (ref 4.5–11.5)

## 2023-03-13 PROCEDURE — 97164 PT RE-EVAL EST PLAN CARE: CPT

## 2023-03-13 PROCEDURE — A4216 STERILE WATER/SALINE, 10 ML: HCPCS | Performed by: INTERNAL MEDICINE

## 2023-03-13 PROCEDURE — 25000242 PHARM REV CODE 250 ALT 637 W/ HCPCS: Performed by: INTERNAL MEDICINE

## 2023-03-13 PROCEDURE — 25000003 PHARM REV CODE 250: Performed by: INTERNAL MEDICINE

## 2023-03-13 PROCEDURE — 80053 COMPREHEN METABOLIC PANEL: CPT | Performed by: INTERNAL MEDICINE

## 2023-03-13 PROCEDURE — 27100098 HC SPACER

## 2023-03-13 PROCEDURE — 85025 COMPLETE CBC W/AUTO DIFF WBC: CPT | Performed by: INTERNAL MEDICINE

## 2023-03-13 PROCEDURE — 27000207 HC ISOLATION

## 2023-03-13 PROCEDURE — 27000221 HC OXYGEN, UP TO 24 HOURS

## 2023-03-13 PROCEDURE — 97535 SELF CARE MNGMENT TRAINING: CPT

## 2023-03-13 PROCEDURE — 25000003 PHARM REV CODE 250: Performed by: EMERGENCY MEDICINE

## 2023-03-13 PROCEDURE — 21400001 HC TELEMETRY ROOM

## 2023-03-13 PROCEDURE — 94761 N-INVAS EAR/PLS OXIMETRY MLT: CPT

## 2023-03-13 PROCEDURE — 63600175 PHARM REV CODE 636 W HCPCS: Performed by: INTERNAL MEDICINE

## 2023-03-13 RX ADMIN — GUAIFENESIN SYRUP AND DEXTROMETHORPHAN 10 ML: 100; 10 SYRUP ORAL at 05:03

## 2023-03-13 RX ADMIN — Medication 10 ML: at 05:03

## 2023-03-13 RX ADMIN — Medication 10 ML: at 02:03

## 2023-03-13 RX ADMIN — METOPROLOL TARTRATE 12.5 MG: 25 TABLET, FILM COATED ORAL at 10:03

## 2023-03-13 RX ADMIN — BENZONATATE 100 MG: 100 CAPSULE ORAL at 05:03

## 2023-03-13 RX ADMIN — DEXAMETHASONE 6 MG: 2 TABLET ORAL at 10:03

## 2023-03-13 RX ADMIN — GUAIFENESIN 600 MG: 600 TABLET, EXTENDED RELEASE ORAL at 10:03

## 2023-03-13 RX ADMIN — HYDROXYZINE PAMOATE 25 MG: 25 CAPSULE ORAL at 10:03

## 2023-03-13 RX ADMIN — LOSARTAN POTASSIUM 25 MG: 25 TABLET, FILM COATED ORAL at 10:03

## 2023-03-13 RX ADMIN — GUAIFENESIN SYRUP AND DEXTROMETHORPHAN 10 ML: 100; 10 SYRUP ORAL at 10:03

## 2023-03-13 RX ADMIN — BENZONATATE 100 MG: 100 CAPSULE ORAL at 10:03

## 2023-03-13 RX ADMIN — ALBUTEROL SULFATE 2 PUFF: 90 AEROSOL, METERED RESPIRATORY (INHALATION) at 10:03

## 2023-03-13 RX ADMIN — ALBUTEROL SULFATE 2 PUFF: 90 AEROSOL, METERED RESPIRATORY (INHALATION) at 02:03

## 2023-03-13 RX ADMIN — ASPIRIN 81 MG: 81 TABLET, COATED ORAL at 10:03

## 2023-03-13 RX ADMIN — FUROSEMIDE 40 MG: 10 INJECTION, SOLUTION INTRAMUSCULAR; INTRAVENOUS at 10:03

## 2023-03-13 RX ADMIN — RIVAROXABAN 20 MG: 10 TABLET, FILM COATED ORAL at 03:03

## 2023-03-13 NOTE — PROGRESS NOTES
[facility]  Hospital Medicine Progress Note        Chief Complaint: Inpatient Follow-up    HPI:   89-year-old female with significant history of CHF with unknown ejection fraction, PAF on Xarelto, HTN.  Patient is a nursing home resident.  Patient presented to the ED with complaints of severe cough, shortness of breath.  Patient was recently seen in Cardiology Clinic multiple times, received IV Lasix all visits.  But despite IV Lasix she is been hypoxemic and required oxygen at nursing home.  Due to persistent symptoms she was brought to the ED.  Patient was afebrile, hemodynamically stable with saturation in mid 90s on 3 L oxygen supplementation.  Chest x-ray consistent with vascular congestion.  COVID-19 PCR was positive.  Hospitalist team was consulted for admission.  Patient was initiated on IV Lasix, treatment protocol for COVID-19 and was admitted to hospitalist medicine service.  Cardiology was consulted.  EF 50% paid patient has valvular heart disease-severe TR and moderate MR.  Cardiology recommended to continue diuresis.  Patient has history of sick sinus syndrome for which she is status pacemaker placement.  Pacemaker interrogated, recommended to continue current settings.  Respiratory status slowly improving on diuresis and appropriate treatment for COVID-19 infection.  Treatment protocol for COVID 19 continued.  Was off Lasix on 03/09, concern for volume overload and therefore restarted.  Also with hypervolemic hyponatremia.  Still with significant volume overload, hypervolemic hyponatremia on 03/10.  IV Lasix increased to 40 mg b.i.d. and added 1 dose of tolvaptan.  IV Lasix continued and Tolvaptan dose repeated on 03/11.  Sodium improving.  Volume status also slowly improving.  Oxygen being weaned as tolerated.  plan to repeat chest x-ray on 03/13.      Interval Hx:   Patient seen at bedside.  She is comfortably sitting in the couch today.  Complaining that she is cold.   Otherwise no specific complaints . weaned down to 2 L on nasal cannula.  No acute events overnight.  Awake, alert and seems oriented, but severely physically deconditioned.      Objective/physical exam:  General: In no acute distress, afebrile  Chest: Clear to auscultation bilaterally  Heart: S1, S2, no appreciable murmur  Abdomen: Soft, nontender, BS +  MSK:  Bilateral lower extremity edema, left arm swollen   Neurologic:  Alert, awake and seems oriented  VITAL SIGNS: 24 HRS MIN & MAX LAST   Temp  Min: 97.3 °F (36.3 °C)  Max: 97.7 °F (36.5 °C) 97.5 °F (36.4 °C)   BP  Min: 118/66  Max: 137/79 118/66     Pulse  Min: 60  Max: 74  60   Resp  Min: 10  Max: 20 18     SpO2  Min: 92 %  Max: 96 % (!) 94 %       Recent Labs   Lab 03/13/23  0403   WBC 10.3   RBC 3.97*   HGB 13.6   HCT 40.5   .0*   MCH 34.3   MCHC 33.6   RDW 14.6   *   MPV 11.6*         Recent Labs   Lab 03/07/23  0635 03/08/23  0411 03/13/23  0403   *   < > 132*   K 3.6   < > 4.0   CO2 31   < > 31   BUN 29.3*   < > 51.0*   CREATININE 1.02   < > 0.99   CALCIUM 8.6   < > 8.7   MG 1.80  --   --    ALBUMIN  --    < > 2.6*   ALKPHOS  --    < > 96   ALT  --    < > 21   AST  --    < > 40*   BILITOT  --    < > 2.5*    < > = values in this interval not displayed.          Microbiology Results (last 7 days)       ** No results found for the last 168 hours. **             Scheduled Med:   albuterol  2 puff Inhalation QID    aspirin  81 mg Oral QAM    dexAMETHasone  6 mg Oral Daily    furosemide (LASIX) injection  40 mg Intravenous Daily    guaiFENesin  600 mg Oral BID    losartan  25 mg Oral Daily    metoprolol tartrate  12.5 mg Oral BID    rivaroxaban  20 mg Oral with dinner    sodium chloride 0.9%  10 mL Intravenous Q6H          Assessment/Plan:      ?Right-sided pneumonia-HAP  Acute decompensated heart failure with preserved ejection fraction/right heart failure  COVID-19 infection   Acute on chronic hypoxemic respiratory failure secondary to both  above  Acute on chronic hypervolemic hyponatremia  Valvular heart disease-severe TR/moderate MR  PAF on Xarelto   History of sick sinus syndrome status post pacemaker placement  Essential HTN-stable   Pulmonary hypertension   Chronic venous insufficiency  Prophylaxis      I repeated chest x-ray today to assess response  to diuresis   Concern for right-sided pneumonia   CT thorax without contrast to further evaluate   No fever, no leukocytosis  Will keep IV Lasix 40 mg daily  Creatinine remaining normal   Sodium remaining more than 130 after 2 doses of tolvaptan on 3/10 & 3/11   BUN rising, will closely monitor  Volume status slowly improving   Respiratory status much better, weaned down to 2 L   Continue to monitor strict Is&Os  Appreciate Cardiology recommendations   Will need outpatient follow-up with Cardiology given valvular heart disease/possible right heart failure/pulmonary hypertension   Pacemaker interrogated this hospitalization-continue current settings  Chlorthalidone discontinued since she is on Lasix  Continue ARB, beta-blocker   Continue aspirin   Patient completed remdesivir  Continue Decadron-day 8 today  Continue isolation precautions for COVID-19 infection per CDC guidelines  D-dimer was elevated, suspicion for PE less likely since the patient is already on Xarelto   DVT ruled out  Ordered left upper extremity venous ultrasound given swelling-no DVT  Noted thrombocytopenia, ultrasound abdomen to rule out cirrhosis  Will have to be cautious with   Xarelto  DVT prophylaxis-on Xarelto    Labs reviewed personally   Sodium 132  BUN is 51 and platelet count is 100 K      Discussed case with case management   Communicated to  that family is wishing her to be transferred to [facility] in [place] for skilled nursing      Ml Monsalve MD   03/13/2023

## 2023-03-13 NOTE — PT/OT/SLP PROGRESS
Occupational Therapy   Treatment    Name: Ana Lake  MRN: [id number]  Admitting Diagnosis:  Acute on chronic congestive heart failure       Recommendations:     Discharge Recommendations: nursing facility, skilled  Discharge Equipment Recommendations:     Barriers to discharge:       Assessment:     Ana Lake is a 89 y.o. female with a medical diagnosis of Acute on chronic congestive heart failure.  She presents with the following. Performance deficits affecting function are impaired endurance, weakness, impaired self care skills, impaired functional mobility, gait instability, impaired balance, decreased upper extremity function.     Rehab Prognosis:  Good; patient would benefit from acute skilled OT services to address these deficits and reach maximum level of function.       Plan:     Patient to be seen 3 x/week, 5 x/week to address the above listed problems via self-care/home management, therapeutic activities, therapeutic exercises  Plan of Care Expires:    Plan of Care Reviewed with: patient    Subjective     Pain/Comfort:  Pain Rating 1: 0/10    Objective:     Communicated with: nrsg prior to session.  Patient found HOB elevated with   upon OT entry to room.    General Precautions: Standard, airborne, contact    Orthopedic Precautions:   Braces:    Respiratory Status: Nasal cannula, flow   L/min     Occupational Performance:     Bed Mobility:    Patient completed Supine to Sit with moderate assistance     Functional Mobility/Transfers:  Patient completed Bed <> Chair Transfer using Step Transfer technique with moderate assistance with rolling walker  Functional Mobility: increased time; posterior lean     Activities of Daily Living:  Grooming: supervision pt completed oral care in chair with increased time       AMPAC 6 Click ADL:        Patient left up in chair with all lines intact, call button in reach, and nrsg present    GOALS:   Multidisciplinary Problems       Occupational Therapy Goals           Problem: Occupational Therapy    Goal Priority Disciplines Outcome Interventions   Occupational Therapy Goal     OT, PT/OT Ongoing, Progressing    Description: Goals to be met by:  3/22/2023    Patient will increase functional independence with ADLs by performing:    UE Dressing with Modified [place].  LE Dressing with Modified [place].  Grooming while standing with Modified [place].  Toileting from bedside commode with Modified [place] for hygiene and clothing management.   Toilet transfer to bedside commode with Modified [place].                         Time Tracking:     OT Date of Treatment:    OT Start Time: 1008  OT Stop Time: 1031  OT Total Time (min): 23 min    Billable Minutes:Self Care/Home Management 23min    OT/ALIREZA: OT     Number of ALIREZA visits since last OT visit: 3    3/13/2023

## 2023-03-13 NOTE — PT/OT/SLP RE-EVAL
Physical Therapy Re-evaluation    Patient Name:  Ana Lake   MRN:  [id number]    Recommendations:     Discharge Recommendations: nursing facility, skilled, rehabilitation facility  Discharge Equipment Recommendations: none   Barriers to discharge: None    Assessment:     Ana Lake is a 89 y.o. female admitted with a medical diagnosis of Acute on chronic congestive heart failure.  She presents with the following impairments/functional limitations: weakness, impaired endurance, impaired self care skills, impaired functional mobility, gait instability, impaired balance, decreased safety awareness, impaired cardiopulmonary response to activity. Patient progressing with PT. Continues to be limited by SOB & fatigue. Requiring mostly MIN A - MOD A for all mobility. Recommend SNF placement at discharge. Progress patient as tolerated.     Rehab Prognosis:  fair; patient would benefit from acute skilled PT services to address these deficits and reach maximum level of function.      Recent Surgery: * No surgery found *      Plan:     During this hospitalization, patient to be seen 5 x/week to address the above listed problems via gait training, therapeutic activities, therapeutic exercises, neuromuscular re-education  Plan of Care Expires:  04/08/23  Plan of Care Reviewed with: patient    Subjective     Communicated with nurse prior to session.  Patient found up in chair with oxygen, PureWick upon PT entry to room, agreeable to evaluation.      Chief Complaint: fatigue  Patient comments/goals: none  Pain/Comfort:  Pain Rating 1: 0/10    Patients cultural, spiritual, Quaker conflicts given the current situation: no      Objective:     Patient found with: oxygen, PureWick     General Precautions: Standard, contact, droplet  Orthopedic Precautions: N/A  Braces: N/A  Respiratory Status: Nasal cannula, flow 2 L/min. Sats 90%-97%.    Exams:  Cognitive Exam:  Patient is oriented to Person, Place, Time, and  Situation  Sensation:    -       Intact  RLE ROM: WFL  RLE Strength: -4/5 grossly  LLE ROM: WFL  LLE Strength: -4/5 grossly    Functional Mobility:  Bed Mobility:     Sit to Supine: moderate assistance  Transfers:     Sit to Stand:  moderate assistance with rolling walker  Gait: 24 ft with RW & MIN A. Fair/poor balance. Posterior lean  Balance: fair/poor    AM-PAC 6 CLICK MOBILITY  Total Score:14    Patient left supine with all lines intact and call button in reach.    GOALS:   Multidisciplinary Problems       Physical Therapy Goals          Problem: Physical Therapy    Goal Priority Disciplines Outcome Goal Variances Interventions   Physical Therapy Goal    High PT, PT/OT Ongoing, Progressing     Description: Goals to be met by: 2023     Patient will increase functional independence with mobility by performin. Sit to stand transfer with Modified [place]  2. Gait  x 200 feet with Modified [place] using LRAD.  3. Supine<>sit with Modified [place]                         History:     No past medical history on file.    No past surgical history on file.    Time Tracking:     PT Received On: 23  PT Start Time: 1330     PT Stop Time: 1350  PT Total Time (min): 20 min     Billable Minutes: Re-eval 20 minutes      2023

## 2023-03-13 NOTE — PLAN OF CARE
Spoke to daughter: Telma regarding D/C planning. She requests referral be sent to [facility]. New referral sent via [facility].

## 2023-03-13 NOTE — PROGRESS NOTES
"Inpatient Nutrition Evaluation    Admit Date: 3/5/2023   Total duration of encounter: 8 days    Nutrition Recommendation/Prescription     Continue oral diet as tolerated.  Boost Plus TID for additional nourishment; provides 360 kcal and 14 gm protein per container.   Monitor wt, labs, and intake.    Nutrition Assessment     Chart Review    Reason Seen: continuous nutrition monitoring and follow-up  Malnutrition Screening Tool Results   Have you recently lost weight without trying?: No  Have you been eating poorly because of a decreased appetite?: No   MST Score: 0     Diagnosis: COVID19, heart failure, pleural effusion, valvular heart disease, SSS s/p pacemaker, venous insufficiency  Relevant Medical History: CHF, atrial fibrillation, HTN  Nutrition-Related Medications: dexamethasone, furosemide  Nutrition-Related Labs: 3/8 Na 130, Cl 91, GFR 50, Alb 2.9  3/13: Na 132, Glu 133, GFR 55    Diet Order: Diet Low Sodium, 2gm  Oral Supplement Order: none  Appetite/Oral Intake: good/% of meals  Factors Affecting Nutritional Intake: none identified  Food/Congregational/Cultural Preferences: none reported  Food Allergies: none reported    Skin Integrity: bruised (ecchymotic)  Wound(s):   buttocks    Comments    3/8/23 No weight/appetite loss per malnutrition screening tool.    3/13/23 Pt eating ~ half of meals per documentation.    Anthropometrics    Height: 4' 11" (149.9 cm) Height Method: Stated  Last Weight: 91.2 kg (201 lb) (03/12/23 2010) Weight Method: Standard Scale  BMI (Calculated): 40.6  BMI Classification: obese grade I (BMI 30-34.9)     Ideal Body Weight (IBW), Female: 95 lb     % Ideal Body Weight, Female (lb): 174.75 %                             Usual Weight Provided By: patient denies unintentional weight loss    Wt Readings from Last 5 Encounters:   03/12/23 91.2 kg (201 lb)     Weight Change(s) Since Admission: 1.8 kg weight change in 2 days, possibly fluid-related versus error  Wt Readings from Last 1 " Encounters:   03/12/23 2010 91.2 kg (201 lb)   03/08/23 1014 77.1 kg (169 lb 15.6 oz)   03/07/23 0127 75.3 kg (166 lb 0.1 oz)   03/05/23 2126 77.1 kg (170 lb)   Admit Weight: 77.1 kg (170 lb) (03/05/23 2126)    Patient Education Not applicable.    Monitoring & Evaluation     Dietitian will monitor food and beverage intake.  Nutrition Risk/Follow-Up: low (follow-up in 5-7 days)  Patients assigned 'low nutrition risk' status do not qualify for a full nutritional assessment but will be monitored and re-evaluated in a 5-7 day time period. Please consult if re-evaluation needed sooner.

## 2023-03-13 NOTE — PLAN OF CARE
Problem: Physical Therapy  Goal: Physical Therapy Goal  Description: Goals to be met by: 2023     Patient will increase functional independence with mobility by performin. Sit to stand transfer with Modified [place]  2. Gait  x 200 feet with Modified [place] using LRAD.  3. Supine<>sit with Modified [place]    Outcome: Ongoing, Progressing

## 2023-03-14 LAB
ALBUMIN SERPL-MCNC: 2.3 G/DL (ref 3.4–4.8)
ALBUMIN/GLOB SERPL: 0.8 RATIO (ref 1.1–2)
ALP SERPL-CCNC: 88 UNIT/L (ref 40–150)
ALT SERPL-CCNC: 21 UNIT/L (ref 0–55)
AST SERPL-CCNC: 39 UNIT/L (ref 5–34)
BASOPHILS # BLD AUTO: 0.01 X10(3)/MCL (ref 0–0.2)
BASOPHILS NFR BLD AUTO: 0.1 %
BILIRUBIN DIRECT+TOT PNL SERPL-MCNC: 2.1 MG/DL
BUN SERPL-MCNC: 48.7 MG/DL (ref 9.8–20.1)
CALCIUM SERPL-MCNC: 8.3 MG/DL (ref 8.4–10.2)
CHLORIDE SERPL-SCNC: 92 MMOL/L (ref 98–107)
CO2 SERPL-SCNC: 35 MMOL/L (ref 23–31)
CREAT SERPL-MCNC: 0.9 MG/DL (ref 0.55–1.02)
EOSINOPHIL # BLD AUTO: 0 X10(3)/MCL (ref 0–0.9)
EOSINOPHIL NFR BLD AUTO: 0 %
ERYTHROCYTE [DISTWIDTH] IN BLOOD BY AUTOMATED COUNT: 14.5 % (ref 11.5–17)
GFR SERPLBLD CREATININE-BSD FMLA CKD-EPI: >60 MLS/MIN/1.73/M2
GLOBULIN SER-MCNC: 2.9 GM/DL (ref 2.4–3.5)
GLUCOSE SERPL-MCNC: 114 MG/DL (ref 82–115)
HCT VFR BLD AUTO: 36 % (ref 37–47)
HGB BLD-MCNC: 12.1 G/DL (ref 12–16)
IMM GRANULOCYTES # BLD AUTO: 0.04 X10(3)/MCL (ref 0–0.04)
IMM GRANULOCYTES NFR BLD AUTO: 0.4 %
LYMPHOCYTES # BLD AUTO: 0.83 X10(3)/MCL (ref 0.6–4.6)
LYMPHOCYTES NFR BLD AUTO: 8.6 %
MCH RBC QN AUTO: 34.2 PG
MCHC RBC AUTO-ENTMCNC: 33.6 G/DL (ref 33–36)
MCV RBC AUTO: 101.7 FL (ref 80–94)
MONOCYTES # BLD AUTO: 0.83 X10(3)/MCL (ref 0.1–1.3)
MONOCYTES NFR BLD AUTO: 8.6 %
NEUTROPHILS # BLD AUTO: 7.89 X10(3)/MCL (ref 2.1–9.2)
NEUTROPHILS NFR BLD AUTO: 82.3 %
NRBC BLD AUTO-RTO: 0 %
PLATELET # BLD AUTO: 97 X10(3)/MCL (ref 130–400)
PMV BLD AUTO: 11.8 FL (ref 7.4–10.4)
POTASSIUM SERPL-SCNC: 3.8 MMOL/L (ref 3.5–5.1)
PROT SERPL-MCNC: 5.2 GM/DL (ref 5.8–7.6)
RBC # BLD AUTO: 3.54 X10(6)/MCL (ref 4.2–5.4)
SODIUM SERPL-SCNC: 135 MMOL/L (ref 136–145)
WBC # SPEC AUTO: 9.6 X10(3)/MCL (ref 4.5–11.5)

## 2023-03-14 PROCEDURE — 85025 COMPLETE CBC W/AUTO DIFF WBC: CPT | Performed by: INTERNAL MEDICINE

## 2023-03-14 PROCEDURE — 25000003 PHARM REV CODE 250: Performed by: INTERNAL MEDICINE

## 2023-03-14 PROCEDURE — 25000242 PHARM REV CODE 250 ALT 637 W/ HCPCS: Performed by: INTERNAL MEDICINE

## 2023-03-14 PROCEDURE — 25000003 PHARM REV CODE 250: Performed by: NURSE PRACTITIONER

## 2023-03-14 PROCEDURE — 97110 THERAPEUTIC EXERCISES: CPT

## 2023-03-14 PROCEDURE — 80053 COMPREHEN METABOLIC PANEL: CPT | Performed by: INTERNAL MEDICINE

## 2023-03-14 PROCEDURE — 27000207 HC ISOLATION

## 2023-03-14 PROCEDURE — A4216 STERILE WATER/SALINE, 10 ML: HCPCS | Performed by: INTERNAL MEDICINE

## 2023-03-14 PROCEDURE — 63600175 PHARM REV CODE 636 W HCPCS: Performed by: INTERNAL MEDICINE

## 2023-03-14 PROCEDURE — 21400001 HC TELEMETRY ROOM

## 2023-03-14 PROCEDURE — 94761 N-INVAS EAR/PLS OXIMETRY MLT: CPT

## 2023-03-14 PROCEDURE — 25000003 PHARM REV CODE 250: Performed by: EMERGENCY MEDICINE

## 2023-03-14 PROCEDURE — 97530 THERAPEUTIC ACTIVITIES: CPT

## 2023-03-14 RX ADMIN — Medication 10 ML: at 12:03

## 2023-03-14 RX ADMIN — DEXAMETHASONE 6 MG: 2 TABLET ORAL at 10:03

## 2023-03-14 RX ADMIN — LOSARTAN POTASSIUM 25 MG: 25 TABLET, FILM COATED ORAL at 10:03

## 2023-03-14 RX ADMIN — ASPIRIN 81 MG: 81 TABLET, COATED ORAL at 10:03

## 2023-03-14 RX ADMIN — ALBUTEROL SULFATE 2 PUFF: 90 AEROSOL, METERED RESPIRATORY (INHALATION) at 10:03

## 2023-03-14 RX ADMIN — BENZONATATE 100 MG: 100 CAPSULE ORAL at 04:03

## 2023-03-14 RX ADMIN — GUAIFENESIN 600 MG: 600 TABLET, EXTENDED RELEASE ORAL at 10:03

## 2023-03-14 RX ADMIN — Medication 10 ML: at 06:03

## 2023-03-14 RX ADMIN — GUAIFENESIN SYRUP AND DEXTROMETHORPHAN 10 ML: 100; 10 SYRUP ORAL at 04:03

## 2023-03-14 RX ADMIN — GUAIFENESIN SYRUP AND DEXTROMETHORPHAN 10 ML: 100; 10 SYRUP ORAL at 10:03

## 2023-03-14 RX ADMIN — BENZONATATE 100 MG: 100 CAPSULE ORAL at 10:03

## 2023-03-14 RX ADMIN — HYDROXYZINE PAMOATE 25 MG: 25 CAPSULE ORAL at 11:03

## 2023-03-14 RX ADMIN — METOPROLOL TARTRATE 12.5 MG: 25 TABLET, FILM COATED ORAL at 10:03

## 2023-03-14 RX ADMIN — ALBUTEROL SULFATE 2 PUFF: 90 AEROSOL, METERED RESPIRATORY (INHALATION) at 05:03

## 2023-03-14 RX ADMIN — Medication 10 ML: at 05:03

## 2023-03-14 RX ADMIN — RIVAROXABAN 20 MG: 10 TABLET, FILM COATED ORAL at 05:03

## 2023-03-14 RX ADMIN — Medication 10 ML: at 11:03

## 2023-03-14 RX ADMIN — DOCUSATE SODIUM 50 MG: 50 CAPSULE, LIQUID FILLED ORAL at 05:03

## 2023-03-14 RX ADMIN — FUROSEMIDE 40 MG: 10 INJECTION, SOLUTION INTRAMUSCULAR; INTRAVENOUS at 10:03

## 2023-03-14 NOTE — PROGRESS NOTES
[facility]  Hospital Medicine Progress Note        Chief Complaint: Inpatient Follow-up    HPI:   89-year-old female with significant history of CHF with unknown ejection fraction, PAF on Xarelto, HTN.  Patient is a nursing home resident.  Patient presented to the ED with complaints of severe cough, shortness of breath.  Patient was recently seen in Cardiology Clinic multiple times, received IV Lasix all visits.  But despite IV Lasix she is been hypoxemic and required oxygen at nursing home.  Due to persistent symptoms she was brought to the ED.  Patient was afebrile, hemodynamically stable with saturation in mid 90s on 3 L oxygen supplementation.  Chest x-ray consistent with vascular congestion.  COVID-19 PCR was positive.  Hospitalist team was consulted for admission.  Patient was initiated on IV Lasix, treatment protocol for COVID-19 and was admitted to hospitalist medicine service.  Cardiology was consulted.  EF 50% paid patient has valvular heart disease-severe TR and moderate MR.  Cardiology recommended to continue diuresis.  Patient has history of sick sinus syndrome for which she is status pacemaker placement.  Pacemaker interrogated, recommended to continue current settings.  Respiratory status slowly improving on diuresis and appropriate treatment for COVID-19 infection.  Treatment protocol for COVID 19 continued.  Was off Lasix on 03/09, concern for volume overload and therefore restarted.  Also with hypervolemic hyponatremia.  Still with significant volume overload, hypervolemic hyponatremia on 03/10.  IV Lasix increased to 40 mg b.i.d. and added 1 dose of tolvaptan.  IV Lasix continued and Tolvaptan dose repeated on 03/11.  Sodium improving.  Volume status also slowly improving.  Oxygen being weaned as tolerated.  plan to repeat chest x-ray on 03/13. Chest x-ray with concerns for right-sided pneumonia status remaining stable with saturation in mid 90s. given rising BUN  Lasix dose decreased to 40 mg daily.      Interval Hx:   Patient seen at bedside.    She is comfortably laying in bed.  Complaining of generalized weakness .  reports she did not have much energy today to sit in the couch and therefore therapy left her in bed.  Hemodynamics are stable.  Saturations in mid 90s.    Objective/physical exam:  General: In no acute distress, afebrile  Chest: Clear to auscultation bilaterally  Heart: S1, S2, no appreciable murmur  Abdomen: Soft, nontender, BS +  MSK:  Bilateral lower extremity edema, left arm swollen   Neurologic:  Alert, awake and seems oriented    VITAL SIGNS: 24 HRS MIN & MAX LAST   Temp  Min: 97.4 °F (36.3 °C)  Max: 98 °F (36.7 °C) 98 °F (36.7 °C)   BP  Min: 116/55  Max: 160/81 130/79     Pulse  Min: 62  Max: 102  67   Resp  Min: 13  Max: 22 19     SpO2  Min: 93 %  Max: 98 % (!) 93 %       Recent Labs   Lab 03/14/23  0417   WBC 9.6   RBC 3.54*   HGB 12.1   HCT 36.0*   .7*   MCH 34.2   MCHC 33.6   RDW 14.5   PLT 97*   MPV 11.8*         Recent Labs   Lab 03/14/23  0417   *   K 3.8   CO2 35*   BUN 48.7*   CREATININE 0.90   CALCIUM 8.3*   ALBUMIN 2.3*   ALKPHOS 88   ALT 21   AST 39*   BILITOT 2.1*          Microbiology Results (last 7 days)       ** No results found for the last 168 hours. **             Scheduled Med:   albuterol  2 puff Inhalation QID    aspirin  81 mg Oral QAM    dexAMETHasone  6 mg Oral Daily    furosemide (LASIX) injection  40 mg Intravenous Daily    guaiFENesin  600 mg Oral BID    losartan  25 mg Oral Daily    metoprolol tartrate  12.5 mg Oral BID    rivaroxaban  20 mg Oral with dinner    sodium chloride 0.9%  10 mL Intravenous Q6H          Assessment/Plan:      Acute decompensated heart failure with preserved ejection fraction/right heart failure  COVID-19 infection   Bilateral lung infiltrates secondary to above  Bilateral pleural effusion/ volume overload secondary to heart failure  Acute on chronic hypoxemic respiratory failure  secondary to both above  Acute on chronic hypervolemic hyponatremia  Valvular heart disease-severe TR/moderate MR  PAF on Xarelto   History of sick sinus syndrome status post pacemaker placement  Essential HTN-stable   Pulmonary hypertension   Chronic venous insufficiency  Prophylaxis       CT thorax from 03/13 consistent with COVID-19 pneumonitis/volume overload  Patient completed treatment protocol for COVID-19 except for Decadron -2 more doses remaining  No fever, no leukocytosis  Will keep IV Lasix 40 mg daily for bilateral pleural effusions  Creatinine remaining normal   Sodium remaining more than 130 after 2 doses of tolvaptan on 3/10 & 3/11    BUN slightly better today after decreasing IV Lasix dose to daily from b.I.d.  Volume status slowly improving   Respiratory status much better, weaned down to 2 L   Continue to monitor strict Is&Os  Appreciate Cardiology recommendations   Will need outpatient follow-up with Cardiology given valvular heart disease/possible right heart failure/pulmonary hypertension   Pacemaker interrogated this hospitalization-continue current settings  Chlorthalidone discontinued since she is on Lasix  Continue ARB, beta-blocker   Continue aspirin   Continue isolation precautions for COVID-19 infection per CDC guidelines  D-dimer was elevated, suspicion for PE less likely since the patient is already on Xarelto   DVT ruled out  Ordered left upper extremity venous ultrasound given swelling-no DVT  Noted thrombocytopenia, ultrasound abdomen  consistent with chronic liver disease  Platelet count is 97 K today, will be cautious with Xarelto  DVT prophylaxis-on Xarelto    Labs reviewed personally   Sodium 135  BUN is  48.7 and platelet count is  97 K      Discussed case with case management    Family wants her to be transferred to another skilled nursing facility- [facility] in [place], case management working on this.      Ml Monsalve MD   03/14/2023

## 2023-03-14 NOTE — PLAN OF CARE
Spoke to Shellie at hospitals. Clinical updates sent for review. I did inform her that patient will be ready for discharge tomorrow. She will discuss with administer but anticipates they will be able to accept and admit tomorrow.

## 2023-03-14 NOTE — PT/OT/SLP PROGRESS
Physical Therapy Treatment    Patient Name:  Ana Lake   MRN:  [id number]    Recommendations:     Discharge Recommendations: nursing facility, skilled  Discharge Equipment Recommendations: none  Barriers to discharge: None    Assessment:     Ana Lake is a 89 y.o. female admitted with a medical diagnosis of Acute on chronic congestive heart failure.  She presents with the following impairments/functional limitations: weakness, impaired endurance, impaired self care skills, impaired functional mobility, gait instability, impaired balance, decreased safety awareness. Poor tolerance of session today. Patient extremely weak and easily fatigued. We decided to return her to bed instead of leaving her up in the chair after session.    Rehab Prognosis: Fair; patient would benefit from acute skilled PT services to address these deficits and reach maximum level of function.    Recent Surgery: * No surgery found *      Plan:     During this hospitalization, patient to be seen 5 x/week to address the identified rehab impairments via gait training, therapeutic activities, therapeutic exercises, neuromuscular re-education and progress toward the following goals:    Plan of Care Expires:  04/08/23    Subjective     Chief Complaint: weakness  Patient/Family Comments/goals: none  Pain/Comfort:  Pain Rating 1: 0/10      Objective:     Communicated with nurse prior to session.  Patient found supine with oxygen, PureWick upon PT entry to room.     General Precautions: Standard, fall  Orthopedic Precautions: N/A  Braces: N/A  Respiratory Status: Nasal cannula, flow 2 L/min. Sats maintained in mid 90's the entire session.      Functional Mobility:  Bed Mobility:     Supine to Sit: moderate assistance  Sit to Supine: maximal assistance  Transfers:     Sit to Stand:  moderate assistance and maximal assistance with rolling walker  Gait: 3 ft with RW & MOD A. Fatigued extremely fast.   Balance: poor      AM-PAC 6 CLICK  MOBILITY  Turning over in bed (including adjusting bedclothes, sheets and blankets)?: 2  Sitting down on and standing up from a chair with arms (e.g., wheelchair, bedside commode, etc.): 2  Moving from lying on back to sitting on the side of the bed?: 2  Moving to and from a bed to a chair (including a wheelchair)?: 2  Need to walk in hospital room?: 2  Climbing 3-5 steps with a railing?: 1  Basic Mobility Total Score: 11       Therex:   -Patient performed seated Marches, Heel Raises, LAQ, Glute Sets, Resisted Hip ABD/ADD. All performed 2 x 10 reps.    Patient left HOB elevated with all lines intact, call button in reach, and CNA present..    GOALS:   Multidisciplinary Problems       Physical Therapy Goals          Problem: Physical Therapy    Goal Priority Disciplines Outcome Goal Variances Interventions   Physical Therapy Goal    High PT, PT/OT Ongoing, Progressing     Description: Goals to be met by: 2023     Patient will increase functional independence with mobility by performin. Sit to stand transfer with Modified [place]  2. Gait  x 200 feet with Modified [place] using LRAD.  3. Supine<>sit with Modified [place]                         Time Tracking:     PT Received On: 23  PT Start Time: 0940     PT Stop Time: 1006  PT Total Time (min): 26 min     Billable Minutes: Therapeutic Activity 16 minutes and Therapeutic Exercise 10 mintues    Treatment Type: Treatment  PT/PTA: PT     Number of PTA visits since last PT visit: 2023

## 2023-03-14 NOTE — PROGRESS NOTES
[facility] Medical Tele[facility]  Wound Care    Patient Name:  Ana Lake   MRN:  [id number]  Date: 3/14/2023  Diagnosis: Acute on chronic congestive heart failure    History:     No past medical history on file.    Social History     Socioeconomic History    Marital status:        Precautions:     Allergies as of 03/05/2023 - Reviewed 03/05/2023   Allergen Reaction Noted    Opioids - morphine analogues Anxiety 03/05/2023       WOC Assessment Details/Treatment      03/14/23 1415        Altered Skin Integrity 03/06/23 2300 Buttocks   Date First Assessed/Time First Assessed: 03/06/23 2300   Altered Skin Integrity Present on Admission - Did Patient arrive to the hospital with altered skin?: yes  Location: Buttocks   Wound Image    Care Cleansed with:;Applied:;Skin Barrier  (Remedy foam)     WOCN follow up visit related to consult 03/07/23 for sacrum; patient had adult brief on which I removed, on GUILLE mattress and repositioned to her R side.  Area cleansed with Remedy foam, applied Z-Guard skin barrier. Care Recommendations:   Continue head to toe skin assessment q 12 hours;  Continue turning/repositioning q 2 hours or schedule to prevent erythema;  Continue with specialty bed and wedge for 30 degree pelvic tilt;  Keep rafael area clean and dry;  Avoid foam dressings on sacrum;  Avoid adult briefs;  Encourage activity as ordered;  Ensure adequate nutrition/hydration for healing;

## 2023-03-15 ENCOUNTER — LAB REQUISITION (OUTPATIENT)
Dept: LAB | Facility: HOSPITAL | Age: 88
End: 2023-03-15
Payer: MEDICARE

## 2023-03-15 VITALS
HEART RATE: 73 BPM | HEIGHT: 59 IN | WEIGHT: 203 LBS | RESPIRATION RATE: 18 BRPM | TEMPERATURE: 99 F | BODY MASS INDEX: 40.92 KG/M2 | OXYGEN SATURATION: 94 % | SYSTOLIC BLOOD PRESSURE: 119 MMHG | DIASTOLIC BLOOD PRESSURE: 70 MMHG

## 2023-03-15 DIAGNOSIS — I10 ESSENTIAL (PRIMARY) HYPERTENSION: ICD-10-CM

## 2023-03-15 LAB
ALBUMIN SERPL-MCNC: 2.2 G/DL (ref 3.4–4.8)
ALBUMIN/GLOB SERPL: 0.8 RATIO (ref 1.1–2)
ALP SERPL-CCNC: 89 UNIT/L (ref 40–150)
ALT SERPL-CCNC: 31 UNIT/L (ref 0–55)
AST SERPL-CCNC: 53 UNIT/L (ref 5–34)
BASOPHILS # BLD AUTO: 0 X10(3)/MCL (ref 0–0.2)
BASOPHILS NFR BLD AUTO: 0 %
BILIRUBIN DIRECT+TOT PNL SERPL-MCNC: 2.4 MG/DL
BUN SERPL-MCNC: 48.6 MG/DL (ref 9.8–20.1)
CALCIUM SERPL-MCNC: 8.2 MG/DL (ref 8.4–10.2)
CHLORIDE SERPL-SCNC: 91 MMOL/L (ref 98–107)
CO2 SERPL-SCNC: 34 MMOL/L (ref 23–31)
CREAT SERPL-MCNC: 0.83 MG/DL (ref 0.55–1.02)
EOSINOPHIL # BLD AUTO: 0 X10(3)/MCL (ref 0–0.9)
EOSINOPHIL NFR BLD AUTO: 0 %
ERYTHROCYTE [DISTWIDTH] IN BLOOD BY AUTOMATED COUNT: 14.4 % (ref 11.5–17)
GFR SERPLBLD CREATININE-BSD FMLA CKD-EPI: >60 MLS/MIN/1.73/M2
GLOBULIN SER-MCNC: 2.6 GM/DL (ref 2.4–3.5)
GLUCOSE SERPL-MCNC: 126 MG/DL (ref 82–115)
HCT VFR BLD AUTO: 33.6 % (ref 37–47)
HGB BLD-MCNC: 11.4 G/DL (ref 12–16)
IMM GRANULOCYTES # BLD AUTO: 0.04 X10(3)/MCL (ref 0–0.04)
IMM GRANULOCYTES NFR BLD AUTO: 0.4 %
LYMPHOCYTES # BLD AUTO: 0.72 X10(3)/MCL (ref 0.6–4.6)
LYMPHOCYTES NFR BLD AUTO: 7.8 %
MCH RBC QN AUTO: 34.2 PG
MCHC RBC AUTO-ENTMCNC: 33.9 G/DL (ref 33–36)
MCV RBC AUTO: 100.9 FL (ref 80–94)
MONOCYTES # BLD AUTO: 0.5 X10(3)/MCL (ref 0.1–1.3)
MONOCYTES NFR BLD AUTO: 5.4 %
NEUTROPHILS # BLD AUTO: 8.01 X10(3)/MCL (ref 2.1–9.2)
NEUTROPHILS NFR BLD AUTO: 86.4 %
NRBC BLD AUTO-RTO: 0 %
PLATELET # BLD AUTO: 95 X10(3)/MCL (ref 130–400)
PMV BLD AUTO: 11.2 FL (ref 7.4–10.4)
POTASSIUM SERPL-SCNC: 4 MMOL/L (ref 3.5–5.1)
PROT SERPL-MCNC: 4.8 GM/DL (ref 5.8–7.6)
RBC # BLD AUTO: 3.33 X10(6)/MCL (ref 4.2–5.4)
SODIUM SERPL-SCNC: 133 MMOL/L (ref 136–145)
WBC # SPEC AUTO: 9.3 X10(3)/MCL (ref 4.5–11.5)

## 2023-03-15 PROCEDURE — A4216 STERILE WATER/SALINE, 10 ML: HCPCS | Performed by: INTERNAL MEDICINE

## 2023-03-15 PROCEDURE — 63600175 PHARM REV CODE 636 W HCPCS: Performed by: INTERNAL MEDICINE

## 2023-03-15 PROCEDURE — 97535 SELF CARE MNGMENT TRAINING: CPT

## 2023-03-15 PROCEDURE — 25000242 PHARM REV CODE 250 ALT 637 W/ HCPCS: Performed by: INTERNAL MEDICINE

## 2023-03-15 PROCEDURE — 97110 THERAPEUTIC EXERCISES: CPT

## 2023-03-15 PROCEDURE — 80053 COMPREHEN METABOLIC PANEL: CPT | Performed by: INTERNAL MEDICINE

## 2023-03-15 PROCEDURE — 25000003 PHARM REV CODE 250: Performed by: INTERNAL MEDICINE

## 2023-03-15 PROCEDURE — 27000221 HC OXYGEN, UP TO 24 HOURS

## 2023-03-15 PROCEDURE — 94761 N-INVAS EAR/PLS OXIMETRY MLT: CPT

## 2023-03-15 PROCEDURE — 97530 THERAPEUTIC ACTIVITIES: CPT

## 2023-03-15 PROCEDURE — 85025 COMPLETE CBC W/AUTO DIFF WBC: CPT | Performed by: INTERNAL MEDICINE

## 2023-03-15 RX ORDER — ALBUTEROL SULFATE 90 UG/1
2 AEROSOL, METERED RESPIRATORY (INHALATION) EVERY 4 HOURS PRN
Qty: 1 G | Refills: 0 | Status: SHIPPED | OUTPATIENT
Start: 2023-03-15 | End: 2023-04-14

## 2023-03-15 RX ORDER — BENZONATATE 100 MG/1
100 CAPSULE ORAL 3 TIMES DAILY PRN
Qty: 30 CAPSULE | Refills: 0 | Status: SHIPPED | OUTPATIENT
Start: 2023-03-15 | End: 2023-03-25

## 2023-03-15 RX ADMIN — Medication 10 ML: at 12:03

## 2023-03-15 RX ADMIN — LOSARTAN POTASSIUM 25 MG: 25 TABLET, FILM COATED ORAL at 09:03

## 2023-03-15 RX ADMIN — ASPIRIN 81 MG: 81 TABLET, COATED ORAL at 10:03

## 2023-03-15 RX ADMIN — GUAIFENESIN 600 MG: 600 TABLET, EXTENDED RELEASE ORAL at 09:03

## 2023-03-15 RX ADMIN — ALBUTEROL SULFATE 2 PUFF: 90 AEROSOL, METERED RESPIRATORY (INHALATION) at 09:03

## 2023-03-15 RX ADMIN — METOPROLOL TARTRATE 12.5 MG: 25 TABLET, FILM COATED ORAL at 09:03

## 2023-03-15 RX ADMIN — ALBUTEROL SULFATE 2 PUFF: 90 AEROSOL, METERED RESPIRATORY (INHALATION) at 12:03

## 2023-03-15 RX ADMIN — DEXAMETHASONE 6 MG: 2 TABLET ORAL at 10:03

## 2023-03-15 RX ADMIN — Medication 10 ML: at 06:03

## 2023-03-15 RX ADMIN — FUROSEMIDE 40 MG: 10 INJECTION, SOLUTION INTRAMUSCULAR; INTRAVENOUS at 09:03

## 2023-03-15 NOTE — PLAN OF CARE
Spoke with Shellie (Admissions Coordinator) regarding patient return to [facility]. Sergio (nurse) will contact charge nurse: Joaquina for report & patient care updates. Transportation van from [place] facility will arrange to pick patient up from [facility] (Room # 407).

## 2023-03-15 NOTE — PT/OT/SLP PROGRESS
Occupational Therapy   Treatment    Name: Ana Lake  MRN: [id number]  Admitting Diagnosis:  Acute on chronic congestive heart failure       Recommendations:     Discharge Recommendations: nursing facility, skilled  Discharge Equipment Recommendations:     Barriers to discharge:       Assessment:     Ana Lake is a 89 y.o. female with a medical diagnosis of Acute on chronic congestive heart failure.  She presents with the following performance deficits affecting function are impaired endurance, weakness, impaired self care skills, impaired functional mobility, gait instability, impaired balance, decreased upper extremity function.     Rehab Prognosis:  Good; patient would benefit from acute skilled OT services to address these deficits and reach maximum level of function.       Plan:     Patient to be seen 3 x/week, 5 x/week to address the above listed problems via self-care/home management, therapeutic exercises, therapeutic activities  Plan of Care Expires:    Plan of Care Reviewed with: patient, family    Subjective     Pain/Comfort:  Pain Rating 1: 0/10    Objective:     Communicated with: nrsg prior to session.  Patient found up in chair with   upon OT entry to room.    General Precautions: Standard, airborne, contact    Orthopedic Precautions:   Braces:    Respiratory Status: Nasal cannula, flow 1.5 L/min     Occupational Performance:     Functional Mobility/Transfers:  Patient completed Sit <> Stand Transfer with moderate assistance  with  rolling walker   Functional Mobility: min-mod A sit <> stand from chair height for BM cleanup and marcos change; pt asking to stay up in chair   02 maintained in 90s throughout on 1.5liters    Activities of Daily Living:  Upper Body Dressing: contact guard assistance to don new gown   Toileting: maximal assistance for BM cleanup   LE dressing; max A to doff/don socks in sitting           Patient left up in chair with all lines intact, call button in reach, and  family present    GOALS:   Multidisciplinary Problems       Occupational Therapy Goals          Problem: Occupational Therapy    Goal Priority Disciplines Outcome Interventions   Occupational Therapy Goal     OT, PT/OT Ongoing, Progressing    Description: Goals to be met by:  3/22/2023    Patient will increase functional independence with ADLs by performing:    UE Dressing with Modified [place].  LE Dressing with Modified [place].  Grooming while standing with Modified [place].  Toileting from bedside commode with Modified [place] for hygiene and clothing management.   Toilet transfer to bedside commode with Modified [place].                         Time Tracking:     OT Date of Treatment:    OT Start Time: 1247  OT Stop Time: 1318  OT Total Time (min): 31 min    Billable Minutes:Self Care/Home Management 31min    OT/ALIREZA: OT     Number of ALIREZA visits since last OT visit: 4    3/15/2023

## 2023-03-15 NOTE — PT/OT/SLP PROGRESS
Physical Therapy Treatment    Patient Name:  Ana Lake   MRN:  [id number]    Recommendations:     Discharge Recommendations: nursing facility, skilled  Discharge Equipment Recommendations: none  Barriers to discharge: None    Assessment:     Ana Lake is a 89 y.o. female admitted with a medical diagnosis of Acute on chronic congestive heart failure.  She presents with the following impairments/functional limitations: weakness, impaired endurance, impaired self care skills, gait instability, impaired functional mobility, impaired balance, decreased safety awareness, impaired cardiopulmonary response to activity. Better PT session today. Still extremely weak and deconditioned, however, activity tolerance was slightly better. Continue to progress as tolerated.     Rehab Prognosis: Fair; patient would benefit from acute skilled PT services to address these deficits and reach maximum level of function.    Recent Surgery: * No surgery found *      Plan:     During this hospitalization, patient to be seen 5 x/week to address the identified rehab impairments via therapeutic activities, gait training, therapeutic exercises, neuromuscular re-education and progress toward the following goals:    Plan of Care Expires:  04/08/23    Subjective     Chief Complaint: weakness  Patient/Family Comments/goals: none  Pain/Comfort:  Pain Rating 1: 0/10      Objective:     Communicated with nurse prior to session.  Patient found HOB elevated with oxygen, PureWick upon PT entry to room.     General Precautions: Standard, fall  Orthopedic Precautions: N/A  Braces: N/A  Respiratory Status: Nasal cannula, flow 2 L/min. Sats 93%-97% during session.      Functional Mobility:  Bed Mobility:     Supine to Sit: moderate assistance  Transfers:     Sit to Stand:  moderate assistance with rolling walker. Performed x5 sit<>stands. Required extended rest breaks.   Gait: 5 ft with RW & MOD A. Cueing for upright posture.   Balance:  poor  Standing Tolerance: Able to stand ~2 minutes per attempt for pericare.       AM-PAC 6 CLICK MOBILITY  Turning over in bed (including adjusting bedclothes, sheets and blankets)?: 2  Sitting down on and standing up from a chair with arms (e.g., wheelchair, bedside commode, etc.): 2  Moving from lying on back to sitting on the side of the bed?: 2  Moving to and from a bed to a chair (including a wheelchair)?: 2  Need to walk in hospital room?: 2  Climbing 3-5 steps with a railing?: 1  Basic Mobility Total Score: 11     Therex:   -Patient performed seated Marches, Heel Raises, LAQ, Glute Sets, Resisted Hip ABD/ADD. All performed 2 x 10 reps.    Patient left up in chair with all lines intact, call button in reach, and OT notified..    GOALS:   Multidisciplinary Problems       Physical Therapy Goals          Problem: Physical Therapy    Goal Priority Disciplines Outcome Goal Variances Interventions   Physical Therapy Goal    High PT, PT/OT Ongoing, Progressing     Description: Goals to be met by: 2023     Patient will increase functional independence with mobility by performin. Sit to stand transfer with Modified [place]  2. Gait  x 200 feet with Modified [place] using LRAD.  3. Supine<>sit with Modified [place]                         Time Tracking:     PT Received On: 03/15/23  PT Start Time: 0810     PT Stop Time: 0840  PT Total Time (min): 30 min     Billable Minutes: Therapeutic Activity 19 minutes and Therapeutic Exercise 11 minutes    Treatment Type: Evaluation  PT/PTA: PT     Number of PTA visits since last PT visit: 2     03/15/2023

## 2023-03-15 NOTE — PLAN OF CARE
03/15/23 1124   Final Note   Assessment Type Final Discharge Note   Anticipated Discharge Disposition SNF  (Patient will return to hospitals upon D/C today.)   Hospital Resources/Appts/Education Provided Post-Acute resouces added to AVS   Post-Acute Status   Post-Acute Authorization Placement   Post-Acute Placement Status Set-up Complete/Auth obtained   Discharge Delays None known at this time

## 2023-03-15 NOTE — DISCHARGE SUMMARY
[facility] Hospital Medicine Discharge Summary    Admit Date: 3/5/2023  Discharge Date and Time: 3/15/221808:44 AM  Admitting Physician:  Team  Discharging Physician: Ml Monsalve MD.  Primary Care Physician: No primary care provider on file.      Discharge Diagnoses:     Acute decompensated heart failure with preserved ejection fraction/right heart failure  COVID-19 infection   Bilateral lung infiltrates secondary to above  Bilateral pleural effusion/ volume overload secondary to heart failure  Acute on chronic hypoxemic respiratory failure secondary to both above  Acute on chronic hypervolemic hyponatremia  Valvular heart disease-severe TR/moderate MR  PAF on Xarelto   History of sick sinus syndrome status post pacemaker placement  Essential HTN-stable   Pulmonary hypertension   Chronic venous insufficiency    Hospital Course:   89-year-old female with significant history of CHF with unknown ejection fraction, PAF on Xarelto, HTN.  Patient is a nursing home resident.  Patient presented to the ED with complaints of severe cough, shortness of breath.  Patient was recently seen in Cardiology Clinic multiple times, received IV Lasix all visits.  But despite IV Lasix she is been hypoxemic and required oxygen at nursing home.  Due to persistent symptoms she was brought to the ED.  Patient was afebrile, hemodynamically stable with saturation in mid 90s on 3 L oxygen supplementation.  Chest x-ray consistent with vascular congestion.  COVID-19 PCR was positive.  Hospitalist team was consulted for admission.  Patient was initiated on IV Lasix, treatment protocol for COVID-19 and was admitted to hospitalist medicine service.  Cardiology was consulted.  EF 50% paid patient has valvular heart disease-severe TR and moderate MR.  Cardiology recommended to continue diuresis.  Patient has history of sick sinus syndrome for which she is status pacemaker placement.  Pacemaker interrogated,  recommended to continue current settings.  Respiratory status slowly improving on diuresis and appropriate treatment for COVID-19 infection.  Treatment protocol for COVID 19 continued.  Was off Lasix on 03/09, concern for volume overload and therefore restarted.  Also with hypervolemic hyponatremia.  Still with significant volume overload, hypervolemic hyponatremia on 03/10.  IV Lasix increased to 40 mg b.i.d. and added 1 dose of tolvaptan.  IV Lasix continued and Tolvaptan dose repeated on 03/11.  Sodium improving.  Volume status also slowly improving.  Oxygen being weaned as tolerated.  plan to repeat chest x-ray on 03/13. Chest x-ray with concerns for right-sided pneumonia status remaining stable with saturation in mid 90s. given rising BUN Lasix dose decreased to 40 mg daily.  Sodium remaining above 130.  BUN slightly better after decreasing Lasix dose.  Respiratory status much better, weaned down to 2 L. patient will need outpatient follow-up with Cardiology for valvular heart disease/possible right heart failure/pulmonary hypertension paid pacemaker was interrogated this hospitalization.  Recommended to continue current settings.  Left upper extremity swelling evaluated with venous ultrasound-no DVT.  Noted thrombocytopenia.  Ultrasound abdomen consistent with chronic liver disease paid will have to be cautious with Xarelto.  Patient re-evaluated 3/15.  Accepted to skilled nursing facility for rehabilitation services.  Patient medically stable.  can continue current care at skilled nursing facility.  Continue oral diuresis at skilled nursing facility.  Patient discharged to skilled nursing facility on 03/15 to continue rehabilitation services.  Discharge medications per med rec.  Follow-up with PCP, Cardiology.  Treatment plans were discussed with the daughters and they voiced understanding to everything explained.  Patient discharged in stable condition.  Continue to monitor labs as outpatient.  Continue to  monitor platelet count  Vitals:  VITAL SIGNS: 24 HRS MIN & MAX LAST   Temp  Min: 97.5 °F (36.4 °C)  Max: 98.8 °F (37.1 °C) 98.7 °F (37.1 °C)   BP  Min: 117/62  Max: 162/94 (!) 162/94     Pulse  Min: 4  Max: 77  77   Resp  Min: 18  Max: 22 18   SpO2  Min: 94 %  Max: 99 % 99 %       Physical Exam:  General appearance:  No acute distress  HENT: Atraumatic   Lungs: Clear to auscultation bilaterally.   Heart: RRR,  Abdomen: Soft, non tender   Extremities: warm  Neuro:  Awake, alert, oriented x4  Psych/mental status: Appropriate mood and affect.      Procedures Performed: No admission procedures for hospital encounter.     Significant Diagnostic Studies: See Full reports for all details    Recent Labs   Lab 03/13/23  0403 03/14/23  0417 03/15/23  0348   WBC 10.3 9.6 9.3   RBC 3.97* 3.54* 3.33*   HGB 13.6 12.1 11.4*   HCT 40.5 36.0* 33.6*   .0* 101.7* 100.9*   MCH 34.3 34.2 34.2   MCHC 33.6 33.6 33.9   RDW 14.6 14.5 14.4   * 97* 95*   MPV 11.6* 11.8* 11.2*       Recent Labs   Lab 03/13/23  0403 03/14/23  0417 03/15/23  0348   * 135* 133*   K 4.0 3.8 4.0   CO2 31 35* 34*   BUN 51.0* 48.7* 48.6*   CREATININE 0.99 0.90 0.83   CALCIUM 8.7 8.3* 8.2*   ALBUMIN 2.6* 2.3* 2.2*   ALKPHOS 96 88 89   ALT 21 21 31   AST 40* 39* 53*   BILITOT 2.5* 2.1* 2.4*        Microbiology Results (last 7 days)       ** No results found for the last 168 hours. **             US Abdomen Limited  Narrative: EXAMINATION:  US ABDOMEN LIMITED    CLINICAL HISTORY:  Rule out cirrhosis;    COMPARISON:  Chest CT same day.    FINDINGS:  Grayscale, color and spectral doppler evaluation of the right upper quadrant.    Pancreas obscured.  Imaged portions of the IVC normal in caliber.  Abdominal aorta obscured.    The liver is not significantly enlarged.  No focal suspicious liver lesion is seen.  Slightly lobular hepatic contour.  The portal vein is patent.  There is biphasic flow which may relate to heart failure.    No gallstones are seen.   There is mild nonspecific gallbladder wall thickening which could relate to liver disease or fluid overload.  The common bile duct is normal in caliber  and measures 4 mm.    Right kidney measures 9 cm in length. No hydronephrosis.    Trace ascites.  There is right pleural effusion as seen on CT.  Impression: 1. Lobular hepatic contour which could be seen with chronic liver disease.  2. Patent portal vein.  3. Trace ascites.  4. No gallstones or biliary ductal dilatation.    Electronically signed by: Gustavo Kendall  Date:    03/13/2023  Time:    16:59  CT Chest Without Contrast  Narrative: EXAMINATION:  CT CHEST WITHOUT CONTRAST    CLINICAL HISTORY:  Rule out pneumonia;    TECHNIQUE:  Low dose axial images, sagittal and coronal reformations were obtained from the thoracic inlet to the lung bases. Contrast was not administered.  Automatic exposure control is utilized to reduce patient radiation exposure.    COMPARISON:  Chest x-ray from 03/13/2023    FINDINGS:  There are patchy areas of ground-glass infiltrates in the lungs bilaterally in the upper and lower lobes with areas of atelectasis in the lung bases.  There are moderate to large pleural effusions bilaterally.  The mediastinum appears normal.  Thoracic aorta appears normal.  There is some soft tissue edema seen along the chest and torso.  Findings are consistent with anasarca.  Impression: Diffuse bilateral patchy ground-glass infiltrates with bibasilar atelectasis and bilateral pleural effusions    Findings consistent with developing anasarca    Electronically signed by: Zuly Greenberg  Date:    03/13/2023  Time:    15:19  X-Ray Chest 1 View  Narrative: EXAMINATION:  XR CHEST 1 VIEW    CPT 51653    CLINICAL HISTORY:  PULMONARY VASCULAR CONGESTION;    COMPARISON:  March 9, 2023    FINDINGS:  Examination reveals cardiomediastinal silhouette to be unchanged as compared with the previous exam.    Ill-defined ground-glass opacities identified at the periphery  by the right perihilar and suprahilar region early infiltrate cannot be completely excluded.    No other focal consolidative changes atelectases effusions or pneumothoraces  Impression: Ill-defined ground-glass type opacity in the at the periphery of the right perihilar and suprahilar region which might be related to an early infiltrate.    No other focal consolidative changes    Electronically signed by: Favian Fox  Date:    03/13/2023  Time:    09:00         Medication List        START taking these medications      albuterol 90 mcg/actuation inhaler  Commonly known as: PROVENTIL/VENTOLIN HFA  Inhale 2 puffs into the lungs every 4 (four) hours as needed for Wheezing. Rescue     benzonatate 100 MG capsule  Commonly known as: TESSALON  Take 1 capsule (100 mg total) by mouth 3 (three) times daily as needed for Cough.            CONTINUE taking these medications      aspirin 81 MG EC tablet  Commonly known as: ECOTRIN     EDARBYCLOR 40-12.5 mg Tab  Generic drug: azilsartan med-chlorthalidone     hydrOXYzine pamoate 25 MG Cap  Commonly known as: VISTARIL     nebivoloL 20 mg Tab  Commonly known as: BYSTOLIC     torsemide 10 MG Tab  Commonly known as: DEMADEX     XARELTO 20 mg Tab  Generic drug: rivaroxaban               Where to Get Your Medications        You can get these medications from any pharmacy    Bring a paper prescription for each of these medications  albuterol 90 mcg/actuation inhaler  benzonatate 100 MG capsule          Explained in detail to the patient about the discharge plan, medications, and follow-up visits. Pt understands and agrees with the treatment plan  Discharge Disposition:     Discharged Condition: stable  Diet-   Dietary Orders (From admission, onward)       Start     Ordered    03/13/23 1445  Dietary nutrition supplements Boost Plus Vanilla; TID  Continuous        Question Answer Comment   Select PO Supplement: Boost Plus Vanilla    Frequency: TID        03/13/23 1444    03/06/23  0255  Diet Low Sodium, 2gm  (Diet/Nutrition [facility])  Diet effective now         03/06/23 0254                   Medications Per DC med rec  Activities as tolerated   Follow-up Information       Wendy Reyes MD. Go on 3/29/2023.    Specialty: Cardiology  Why: Follow up appointment on March 31 at 10:00 am at the [facility] location  Contact information:  [address] [place] [address]  [place] [address]  [phone]               PCP in 1 week PCP Follow up.               CMP in 2 to 3 days at NH Follow up.                           For further questions contact hospitalist office    Discharge time 33 minutes    For worsening symptoms, chest pain, shortness of breath, increased abdominal pain, high grade fever, stroke or stroke like symptoms, immediately go to the nearest Emergency Room or call 911 as soon as possible.      Ml Walls M.D, on 3/15/2023. at 10:44 AM.

## 2023-03-16 LAB
ALBUMIN SERPL-MCNC: 2.1 G/DL (ref 3.4–4.8)
ALBUMIN/GLOB SERPL: 0.7 RATIO (ref 1.1–2)
ALP SERPL-CCNC: 99 UNIT/L (ref 40–150)
ALT SERPL-CCNC: 51 UNIT/L (ref 0–55)
AST SERPL-CCNC: 75 UNIT/L (ref 5–34)
BILIRUBIN DIRECT+TOT PNL SERPL-MCNC: 2.6 MG/DL
BUN SERPL-MCNC: 47.6 MG/DL (ref 9.8–20.1)
CALCIUM SERPL-MCNC: 8.2 MG/DL (ref 8.4–10.2)
CHLORIDE SERPL-SCNC: 89 MMOL/L (ref 98–107)
CHOLEST SERPL-MCNC: 78 MG/DL
CHOLEST/HDLC SERPL: 3 {RATIO} (ref 0–5)
CO2 SERPL-SCNC: 35 MMOL/L (ref 23–31)
CREAT SERPL-MCNC: 0.76 MG/DL (ref 0.55–1.02)
ERYTHROCYTE [DISTWIDTH] IN BLOOD BY AUTOMATED COUNT: 14.2 % (ref 11.5–17)
GFR SERPLBLD CREATININE-BSD FMLA CKD-EPI: >60 MLS/MIN/1.73/M2
GLOBULIN SER-MCNC: 2.9 GM/DL (ref 2.4–3.5)
GLUCOSE SERPL-MCNC: 107 MG/DL (ref 82–115)
HCT VFR BLD AUTO: 33.6 % (ref 37–47)
HDLC SERPL-MCNC: 29 MG/DL (ref 35–60)
HGB BLD-MCNC: 11.3 G/DL (ref 12–16)
LDLC SERPL CALC-MCNC: 41 MG/DL (ref 50–140)
MCH RBC QN AUTO: 34.6 PG
MCHC RBC AUTO-ENTMCNC: 33.6 G/DL (ref 33–36)
MCV RBC AUTO: 102.8 FL (ref 80–94)
PLATELET # BLD AUTO: 90 X10(3)/MCL (ref 130–400)
PMV BLD AUTO: 11.7 FL (ref 7.4–10.4)
POTASSIUM SERPL-SCNC: 3.9 MMOL/L (ref 3.5–5.1)
PROT SERPL-MCNC: 5 GM/DL (ref 5.8–7.6)
RBC # BLD AUTO: 3.27 X10(6)/MCL (ref 4.2–5.4)
SODIUM SERPL-SCNC: 130 MMOL/L (ref 136–145)
TRIGL SERPL-MCNC: 40 MG/DL (ref 37–140)
TSH SERPL-ACNC: 0.68 UIU/ML (ref 0.35–4.94)
VLDLC SERPL CALC-MCNC: 8 MG/DL
WBC # SPEC AUTO: 13 X10(3)/MCL (ref 4.5–11.5)

## 2023-03-16 PROCEDURE — 80061 LIPID PANEL: CPT | Performed by: FAMILY MEDICINE

## 2023-03-16 PROCEDURE — 85027 COMPLETE CBC AUTOMATED: CPT | Performed by: FAMILY MEDICINE

## 2023-03-16 PROCEDURE — 80053 COMPREHEN METABOLIC PANEL: CPT | Performed by: FAMILY MEDICINE

## 2023-03-16 PROCEDURE — 84443 ASSAY THYROID STIM HORMONE: CPT | Performed by: FAMILY MEDICINE

## 2023-03-21 ENCOUNTER — LAB REQUISITION (OUTPATIENT)
Dept: LAB | Facility: HOSPITAL | Age: 88
End: 2023-03-21
Attending: FAMILY MEDICINE
Payer: MEDICARE

## 2023-03-21 DIAGNOSIS — E87.6 HYPOKALEMIA: ICD-10-CM

## 2023-03-22 LAB
ALBUMIN SERPL-MCNC: 2 G/DL (ref 3.4–4.8)
ALBUMIN/GLOB SERPL: 0.7 RATIO (ref 1.1–2)
ALP SERPL-CCNC: 110 UNIT/L (ref 40–150)
ALT SERPL-CCNC: 42 UNIT/L (ref 0–55)
AST SERPL-CCNC: 49 UNIT/L (ref 5–34)
BILIRUBIN DIRECT+TOT PNL SERPL-MCNC: 2 MG/DL
BUN SERPL-MCNC: 63.1 MG/DL (ref 9.8–20.1)
CALCIUM SERPL-MCNC: 8.1 MG/DL (ref 8.4–10.2)
CHLORIDE SERPL-SCNC: 92 MMOL/L (ref 98–107)
CO2 SERPL-SCNC: 35 MMOL/L (ref 23–31)
CREAT SERPL-MCNC: 1.27 MG/DL (ref 0.55–1.02)
GFR SERPLBLD CREATININE-BSD FMLA CKD-EPI: 41 MLS/MIN/1.73/M2
GLOBULIN SER-MCNC: 3 GM/DL (ref 2.4–3.5)
GLUCOSE SERPL-MCNC: 102 MG/DL (ref 82–115)
POTASSIUM SERPL-SCNC: 3.3 MMOL/L (ref 3.5–5.1)
PROT SERPL-MCNC: 5 GM/DL (ref 5.8–7.6)
SODIUM SERPL-SCNC: 136 MMOL/L (ref 136–145)

## 2023-03-22 PROCEDURE — 80053 COMPREHEN METABOLIC PANEL: CPT | Performed by: FAMILY MEDICINE

## 2023-05-01 NOTE — PLAN OF CARE
Problem: Adult Inpatient Plan of Care  Goal: Plan of Care Review  Outcome: Ongoing, Progressing  Goal: Patient-Specific Goal (Individualized)  Outcome: Ongoing, Progressing  Goal: Absence of Hospital-Acquired Illness or Injury  Outcome: Ongoing, Progressing  Goal: Optimal Comfort and Wellbeing  Outcome: Ongoing, Progressing  Goal: Readiness for Transition of Care  Outcome: Ongoing, Progressing     Problem: Impaired Wound Healing  Goal: Optimal Wound Healing  Outcome: Ongoing, Progressing     Problem: Skin Injury Risk Increased  Goal: Skin Health and Integrity  Outcome: Ongoing, Progressing     Problem: Fall Injury Risk  Goal: Absence of Fall and Fall-Related Injury  Outcome: Ongoing, Progressing      Medication has been pended to provider for approval.

## 2023-06-11 NOTE — PLAN OF CARE
Problem: Physical Therapy  Goal: Physical Therapy Goal  Description: Goals to be met by: 2023     Patient will increase functional independence with mobility by performin. Sit to stand transfer with Modified [place]  2. Gait  x 200 feet with Modified [place] using LRAD.  3. Supine<>sit with Modified [place]    Outcome: Ongoing, Progressing      Adequate hydration advised needs 60  ounces daily     OTC Ibuprofen 400mg  every 8 hours with food as needed. Limit dosing to as needed.  Take with food.     OTC Tylenol E.S. 500mg, Take 1- 2 tabs every 6-8 hours for pain. No more than 3000mg in 24 hours.       Okay to use Ace wrap for compression.  Off at bedtime and periodically throughout the day follow with gentle stretching.